# Patient Record
Sex: FEMALE | Race: WHITE | NOT HISPANIC OR LATINO | Employment: FULL TIME | URBAN - METROPOLITAN AREA
[De-identification: names, ages, dates, MRNs, and addresses within clinical notes are randomized per-mention and may not be internally consistent; named-entity substitution may affect disease eponyms.]

---

## 2020-07-30 ENCOUNTER — TELEPHONE (OUTPATIENT)
Dept: OBGYN CLINIC | Facility: CLINIC | Age: 30
End: 2020-07-30

## 2020-07-30 RX ORDER — LEVONORGESTREL AND ETHINYL ESTRADIOL 6-5-10
1 KIT ORAL DAILY
COMMUNITY
End: 2020-08-12 | Stop reason: SDUPTHER

## 2020-07-30 RX ORDER — LEVONORGESTREL AND ETHINYL ESTRADIOL 6-5-10
1 KIT ORAL DAILY
Qty: 28 TABLET | Refills: 1 | Status: CANCELLED | OUTPATIENT
Start: 2020-07-30

## 2020-08-12 DIAGNOSIS — Z30.41 ENCOUNTER FOR SURVEILLANCE OF CONTRACEPTIVE PILLS: Primary | ICD-10-CM

## 2020-08-12 RX ORDER — LEVONORGESTREL AND ETHINYL ESTRADIOL 6-5-10
1 KIT ORAL DAILY
Qty: 28 TABLET | Refills: 1 | Status: SHIPPED | OUTPATIENT
Start: 2020-08-12 | End: 2020-08-12 | Stop reason: SDUPTHER

## 2020-08-12 RX ORDER — LEVONORGESTREL AND ETHINYL ESTRADIOL 6-5-10
1 KIT ORAL DAILY
Qty: 28 TABLET | Refills: 1 | Status: SHIPPED | OUTPATIENT
Start: 2020-08-12 | End: 2020-08-17 | Stop reason: SDUPTHER

## 2020-08-14 ENCOUNTER — TELEPHONE (OUTPATIENT)
Dept: OBGYN CLINIC | Facility: CLINIC | Age: 30
End: 2020-08-14

## 2020-08-14 DIAGNOSIS — Z30.41 ENCOUNTER FOR SURVEILLANCE OF CONTRACEPTIVE PILLS: ICD-10-CM

## 2020-08-14 RX ORDER — LEVONORGESTREL AND ETHINYL ESTRADIOL 6-5-10
1 KIT ORAL DAILY
Qty: 28 TABLET | Refills: 1 | Status: CANCELLED | OUTPATIENT
Start: 2020-08-14

## 2020-08-17 DIAGNOSIS — Z30.41 ENCOUNTER FOR SURVEILLANCE OF CONTRACEPTIVE PILLS: ICD-10-CM

## 2020-08-17 RX ORDER — LEVONORGESTREL AND ETHINYL ESTRADIOL 6-5-10
1 KIT ORAL DAILY
Qty: 84 TABLET | Refills: 0 | Status: SHIPPED | OUTPATIENT
Start: 2020-08-17 | End: 2020-08-24 | Stop reason: SDUPTHER

## 2020-08-24 ENCOUNTER — ANNUAL EXAM (OUTPATIENT)
Dept: OBGYN CLINIC | Facility: CLINIC | Age: 30
End: 2020-08-24
Payer: COMMERCIAL

## 2020-08-24 VITALS — TEMPERATURE: 98.5 F | DIASTOLIC BLOOD PRESSURE: 78 MMHG | WEIGHT: 263 LBS | SYSTOLIC BLOOD PRESSURE: 120 MMHG

## 2020-08-24 DIAGNOSIS — Z01.419 ROUTINE GYNECOLOGICAL EXAMINATION: Primary | ICD-10-CM

## 2020-08-24 DIAGNOSIS — Z11.51 SCREENING FOR HUMAN PAPILLOMAVIRUS: ICD-10-CM

## 2020-08-24 DIAGNOSIS — Z12.4 SCREENING FOR MALIGNANT NEOPLASM OF THE CERVIX: ICD-10-CM

## 2020-08-24 DIAGNOSIS — Z30.41 ENCOUNTER FOR SURVEILLANCE OF CONTRACEPTIVE PILLS: ICD-10-CM

## 2020-08-24 PROCEDURE — 87624 HPV HI-RISK TYP POOLED RSLT: CPT | Performed by: OBSTETRICS & GYNECOLOGY

## 2020-08-24 PROCEDURE — G0145 SCR C/V CYTO,THINLAYER,RESCR: HCPCS | Performed by: OBSTETRICS & GYNECOLOGY

## 2020-08-24 PROCEDURE — 99385 PREV VISIT NEW AGE 18-39: CPT | Performed by: OBSTETRICS & GYNECOLOGY

## 2020-08-24 RX ORDER — LEVONORGESTREL AND ETHINYL ESTRADIOL 6-5-10
1 KIT ORAL DAILY
Qty: 84 TABLET | Refills: 3 | Status: SHIPPED | OUTPATIENT
Start: 2020-08-24 | End: 2020-09-14 | Stop reason: SDUPTHER

## 2020-08-24 NOTE — PROGRESS NOTES
Subjective      Dyan Lopez is a 27 y o  female who presents for annual well woman exam  Periods are regular every 28-30 days, lasting 4 days  No intermenstrual bleeding, spotting, or discharge  Current contraception: OCP (estrogen/progesterone)  History of abnormal Pap smear: no  Family history of uterine or ovarian cancer: no  Regular self breast exam: yes    Menstrual History:  OB History        1    Para   1    Term   1            AB        Living   1       SAB        TAB        Ectopic        Multiple        Live Births   1           Obstetric Comments   1 C/S - IOL for hypertension, failed induction x2          Patient's last menstrual period was 2020 (exact date)  Period Cycle (Days): 28  Period Duration (Days): 4-5 days  Period Pattern: Regular  Menstrual Flow: Light  Dysmenorrhea: None    The following portions of the patient's history were reviewed and updated as appropriate: allergies, current medications, past family history, past medical history, past social history, past surgical history and problem list     Review of Systems  Review of Systems   Constitutional: Negative for activity change, appetite change, chills, fatigue and fever  Respiratory: Negative for cough and shortness of breath  Cardiovascular: Negative for chest pain, palpitations and leg swelling  Gastrointestinal: Negative for abdominal pain, constipation, diarrhea, nausea and vomiting  Genitourinary: Negative for difficulty urinating, dysuria, flank pain, frequency, hematuria, urgency and vaginal discharge  Neurological: Negative for dizziness and headaches  Psychiatric/Behavioral: Negative for confusion            Objective      /78 (BP Location: Left arm, Patient Position: Sitting, Cuff Size: Extra-Large)   Temp 98 5 °F (36 9 °C) (Tympanic)   Wt 119 kg (263 lb)   LMP 2020 (Exact Date)     Physical Exam  OBGyn Exam     General:   alert and oriented, in no acute distress, alert, appears stated age and cooperative obese   Heart: regular rate and rhythm, S1, S2 normal, no murmur, click, rub or gallop   Lungs: clear to auscultation bilaterally   Abdomen: soft, non-tender, without masses or organomegaly   Vulva: normal   Vagina: normal mucosa, normal discharge   Cervix: no bleeding following Pap and no cervical motion tenderness   Uterus: normal size   Adnexa:  Breast Exam:  normal adnexa and no mass, fullness, tenderness  breasts appear normal, no suspicious masses, no skin or nipple changes or axillary nodes  Assessment      @well woman@   66-year-old female  Annual exam  Prior   Severe preeclampsia  OCP contraception desire to continue  Plan   Pap/HPV  Diet/exercise  Calcium/vitamin-D  Return to office for annual exam   All questions answered  There are no Patient Instructions on file for this visit

## 2020-08-27 LAB
HPV HR 12 DNA CVX QL NAA+PROBE: NEGATIVE
HPV16 DNA CVX QL NAA+PROBE: NEGATIVE
HPV18 DNA CVX QL NAA+PROBE: NEGATIVE

## 2020-08-28 LAB
LAB AP GYN PRIMARY INTERPRETATION: NORMAL
LAB AP LMP: NORMAL
Lab: NORMAL

## 2020-09-14 ENCOUNTER — TELEPHONE (OUTPATIENT)
Dept: OBGYN CLINIC | Facility: CLINIC | Age: 30
End: 2020-09-14

## 2020-09-14 DIAGNOSIS — Z30.41 ENCOUNTER FOR SURVEILLANCE OF CONTRACEPTIVE PILLS: ICD-10-CM

## 2020-09-14 RX ORDER — LEVONORGESTREL AND ETHINYL ESTRADIOL 6-5-10
1 KIT ORAL DAILY
Qty: 84 TABLET | Refills: 3 | Status: SHIPPED | OUTPATIENT
Start: 2020-09-14 | End: 2021-07-26 | Stop reason: SDUPTHER

## 2020-09-14 NOTE — TELEPHONE ENCOUNTER
Medication was not able to sent directly to the pharmacy please print medication infected to the pharmacy

## 2020-09-14 NOTE — TELEPHONE ENCOUNTER
She was here for  A yearly visit and script was sent to Pharmacy , patient states they said they never received the prescription, can you resend

## 2021-07-26 DIAGNOSIS — Z30.41 ENCOUNTER FOR SURVEILLANCE OF CONTRACEPTIVE PILLS: ICD-10-CM

## 2021-07-26 RX ORDER — LEVONORGESTREL AND ETHINYL ESTRADIOL 6-5-10
1 KIT ORAL DAILY
Qty: 84 TABLET | Refills: 0 | Status: SHIPPED | OUTPATIENT
Start: 2021-07-26 | End: 2021-08-02 | Stop reason: SDUPTHER

## 2021-07-26 NOTE — TELEPHONE ENCOUNTER
patient would like  Refills until her appointment in September  levonorgestrel-ethinyl estradiol (ENPRESSE) 50-30/75-40/ 125-30 MCG tablet (triphasic) [[id number]]  levonorgestrel-ethinyl estradiol (ENPRESSE) 50-30/75-40/ 125-30 MCG tablet (triphasic) [[id number]]     Order Details  Dose: 1 tablet Route: Oral Frequency: Daily   Dispense Quantity: 84 tablet Refills: 3          Sig: Take 1 tablet by mouth daily

## 2021-08-02 DIAGNOSIS — Z30.41 ENCOUNTER FOR SURVEILLANCE OF CONTRACEPTIVE PILLS: ICD-10-CM

## 2021-08-02 RX ORDER — LEVONORGESTREL AND ETHINYL ESTRADIOL 6-5-10
1 KIT ORAL DAILY
Qty: 84 TABLET | Refills: 0 | Status: SHIPPED | OUTPATIENT
Start: 2021-08-02 | End: 2021-09-10 | Stop reason: SDUPTHER

## 2021-08-02 RX ORDER — LEVONORGESTREL AND ETHINYL ESTRADIOL 6-5-10
1 KIT ORAL DAILY
Qty: 84 TABLET | Refills: 0 | Status: SHIPPED | OUTPATIENT
Start: 2021-08-02 | End: 2021-08-02 | Stop reason: SDUPTHER

## 2021-08-02 NOTE — TELEPHONE ENCOUNTER
Spoke with the patient and advised the patient to call the pharmacy, that medication was sent in for her

## 2021-09-10 ENCOUNTER — ANNUAL EXAM (OUTPATIENT)
Dept: OBGYN CLINIC | Facility: CLINIC | Age: 31
End: 2021-09-10
Payer: COMMERCIAL

## 2021-09-10 VITALS
DIASTOLIC BLOOD PRESSURE: 80 MMHG | HEIGHT: 61 IN | BODY MASS INDEX: 49.69 KG/M2 | SYSTOLIC BLOOD PRESSURE: 124 MMHG | WEIGHT: 263.2 LBS

## 2021-09-10 DIAGNOSIS — Z01.419 ENCOUNTER FOR GYNECOLOGICAL EXAMINATION WITHOUT ABNORMAL FINDING: Primary | ICD-10-CM

## 2021-09-10 DIAGNOSIS — Z30.41 ENCOUNTER FOR SURVEILLANCE OF CONTRACEPTIVE PILLS: ICD-10-CM

## 2021-09-10 PROCEDURE — 99395 PREV VISIT EST AGE 18-39: CPT | Performed by: PHYSICIAN ASSISTANT

## 2021-09-10 PROCEDURE — G0145 SCR C/V CYTO,THINLAYER,RESCR: HCPCS | Performed by: PHYSICIAN ASSISTANT

## 2021-09-10 PROCEDURE — G0476 HPV COMBO ASSAY CA SCREEN: HCPCS | Performed by: PHYSICIAN ASSISTANT

## 2021-09-10 RX ORDER — LEVONORGESTREL AND ETHINYL ESTRADIOL 6-5-10
1 KIT ORAL DAILY
Qty: 84 TABLET | Refills: 3 | Status: SHIPPED | OUTPATIENT
Start: 2021-09-10

## 2021-09-10 NOTE — PROGRESS NOTES
Assessment/Plan:    No problem-specific Assessment & Plan notes found for this encounter  Diagnoses and all orders for this visit:    Encounter for gynecological examination without abnormal finding  -     Liquid-based pap, screening    Encounter for surveillance of contraceptive pills  -     levonorgestrel-ethinyl estradiol (ENPRESSE) 50-30/75-40/ 125-30 MCG tablet (triphasic); Take 1 tablet by mouth daily        Pap done  Refills of OCP sent to pharmacy  Counseled patient she would most likely start aspirin once pregnancy is achieved  Start PNV 3 months prior to trying to conceive  If no problems, patient to return in 1 year for routine gyn care  Subjective:      Patient ID: Dejah Aparicio is a 32 y o  female  Patient is here for annual exam   States she is doing well  No complaints on her OCP  Periods are regular every 28 days, and bleeding lasts for 4 days  She denies any heavy bleeding, severe cramping, headache, and mood symptoms  Requests refills today  Patient also denies any change in bowel/bladder habits, pelvic pain, bloating, abdominal pain, n/v, change in appetite, and thyroid disease  She and her  would like to start trying to conceive in the spring/summer 2022  Has questions regarding starting aspirin therapy secondary to history of pre-eclampsia  Patient is performing self-breast exam   Denies new masses, skin changes, nipple discharge, and pain/tenderness  The following portions of the patient's history were reviewed and updated as appropriate: allergies, current medications, past family history, past medical history, past social history, past surgical history and problem list     Review of Systems   Constitutional: Negative for appetite change and unexpected weight change  Cardiovascular:        No masses, skin changes, nipple discharge, and pain/tenderness     Gastrointestinal: Negative for abdominal distention, abdominal pain, constipation, diarrhea, nausea and vomiting  Genitourinary: Negative for difficulty urinating, dysuria, frequency, genital sores, hematuria, menstrual problem, pelvic pain, urgency, vaginal bleeding, vaginal discharge and vaginal pain  Objective:      /80 (BP Location: Left arm, Patient Position: Sitting, Cuff Size: Standard)   Ht 5' 1" (1 549 m)   Wt 119 kg (263 lb 3 2 oz)   LMP 08/25/2021   BMI 49 73 kg/m²          Physical Exam  Vitals reviewed  Exam conducted with a chaperone present  Constitutional:       Appearance: Normal appearance  She is well-developed  She is obese  Neck:      Thyroid: No thyromegaly  Pulmonary:      Effort: Pulmonary effort is normal    Chest:      Breasts: Breasts are symmetrical          Right: Normal  No swelling, bleeding, inverted nipple, mass, nipple discharge, skin change or tenderness  Left: Normal  No swelling, bleeding, inverted nipple, mass, nipple discharge, skin change or tenderness  Abdominal:      General: Abdomen is flat  There is no distension  Palpations: Abdomen is soft  Tenderness: There is no abdominal tenderness  Genitourinary:     General: Normal vulva  Pubic Area: No rash  Labia:         Right: No rash, tenderness, lesion or injury  Left: No rash, tenderness, lesion or injury  Vagina: Normal  No vaginal discharge, erythema, tenderness or bleeding  Cervix: Normal       Uterus: Normal        Adnexa: Right adnexa normal and left adnexa normal         Right: No mass, tenderness or fullness  Left: No mass, tenderness or fullness  Musculoskeletal:      Cervical back: Neck supple  Lymphadenopathy:      Cervical: No cervical adenopathy  Upper Body:      Right upper body: No supraclavicular or axillary adenopathy  Left upper body: No supraclavicular or axillary adenopathy  Lower Body: No right inguinal adenopathy  No left inguinal adenopathy  Skin:     General: Skin is warm and dry  Neurological:      Mental Status: She is alert and oriented to person, place, and time  Psychiatric:         Mood and Affect: Mood normal          Behavior: Behavior normal  Behavior is cooperative  Thought Content:  Thought content normal          Judgment: Judgment normal

## 2021-09-15 LAB
LAB AP GYN PRIMARY INTERPRETATION: NORMAL
Lab: NORMAL

## 2022-09-26 ENCOUNTER — ANNUAL EXAM (OUTPATIENT)
Dept: OBGYN CLINIC | Facility: CLINIC | Age: 32
End: 2022-09-26
Payer: COMMERCIAL

## 2022-09-26 VITALS
HEIGHT: 61 IN | DIASTOLIC BLOOD PRESSURE: 94 MMHG | WEIGHT: 279 LBS | SYSTOLIC BLOOD PRESSURE: 138 MMHG | BODY MASS INDEX: 52.67 KG/M2

## 2022-09-26 DIAGNOSIS — Z01.419 ENCOUNTER FOR GYNECOLOGICAL EXAMINATION WITHOUT ABNORMAL FINDING: Primary | ICD-10-CM

## 2022-09-26 PROCEDURE — G0145 SCR C/V CYTO,THINLAYER,RESCR: HCPCS | Performed by: PHYSICIAN ASSISTANT

## 2022-09-26 PROCEDURE — G0476 HPV COMBO ASSAY CA SCREEN: HCPCS | Performed by: PHYSICIAN ASSISTANT

## 2022-09-26 PROCEDURE — 99395 PREV VISIT EST AGE 18-39: CPT | Performed by: PHYSICIAN ASSISTANT

## 2022-09-26 NOTE — PROGRESS NOTES
Assessment/Plan:    No problem-specific Assessment & Plan notes found for this encounter  Diagnoses and all orders for this visit:    Encounter for gynecological examination without abnormal finding  -     Liquid-based pap, screening        Pap done  Continue PNV  Call if periods worsen or change  If no problems, patient to return in 1 year or with positive pregnancy test, whichever comes first     Subjective:      Patient ID: Gerard Kessler is a 28 y o  female  Patient is here for yearly gyn exam   States she is doing well overall  Stopped OCP as she and her  are trying to conceive  Periods are regular every 28 days, and bleeding lasts for 3-5 days  She denies heavy bleeding, severe cramping, HA, and mood symptoms  Patient also denies bowel/bladder changes, pelvic pain, bloating, abdominal pain, n/v, change in appetite, and thyroid disease  Taking PNV  Patient is performing self-breast exam   She denies new masses, skin changes, nipple discharge, and pain/tenderness  The following portions of the patient's history were reviewed and updated as appropriate: allergies, current medications, past family history, past medical history, past social history, past surgical history and problem list     Review of Systems   Constitutional: Negative for appetite change and unexpected weight change  Cardiovascular:        No masses, skin changes, nipple discharge, and pain/tenderness  Gastrointestinal: Negative for abdominal distention, abdominal pain, constipation, diarrhea, nausea and vomiting  Genitourinary: Negative for difficulty urinating, dysuria, frequency, genital sores, hematuria, menstrual problem, pelvic pain, urgency, vaginal bleeding, vaginal discharge and vaginal pain           Objective:      /94 (BP Location: Left arm, Patient Position: Sitting, Cuff Size: Adult)   Ht 5' 1" (1 549 m)   Wt 127 kg (279 lb)   LMP 09/08/2022 (Exact Date)   BMI 52 72 kg/m² Physical Exam  Vitals reviewed  Exam conducted with a chaperone present  Constitutional:       Appearance: Normal appearance  She is well-developed  She is obese  Neck:      Thyroid: No thyromegaly  Pulmonary:      Effort: Pulmonary effort is normal    Chest:   Breasts: Breasts are symmetrical       Right: Normal  No swelling, bleeding, inverted nipple, mass, nipple discharge, skin change, tenderness, axillary adenopathy or supraclavicular adenopathy  Left: Normal  No swelling, bleeding, inverted nipple, mass, nipple discharge, skin change, tenderness, axillary adenopathy or supraclavicular adenopathy  Abdominal:      General: Abdomen is flat  There is no distension  Palpations: Abdomen is soft  Tenderness: There is no abdominal tenderness  Genitourinary:     General: Normal vulva  Pubic Area: No rash  Labia:         Right: No rash, tenderness, lesion or injury  Left: No rash, tenderness, lesion or injury  Vagina: Normal  No vaginal discharge, erythema, tenderness or bleeding  Cervix: Normal       Uterus: Normal        Adnexa: Right adnexa normal and left adnexa normal         Right: No mass, tenderness or fullness  Left: No mass, tenderness or fullness  Musculoskeletal:      Cervical back: Neck supple  Lymphadenopathy:      Cervical: No cervical adenopathy  Upper Body:      Right upper body: No supraclavicular or axillary adenopathy  Left upper body: No supraclavicular or axillary adenopathy  Lower Body: No right inguinal adenopathy  No left inguinal adenopathy  Skin:     General: Skin is warm and dry  Neurological:      Mental Status: She is alert and oriented to person, place, and time  Psychiatric:         Mood and Affect: Mood normal          Behavior: Behavior normal  Behavior is cooperative  Thought Content:  Thought content normal          Judgment: Judgment normal

## 2022-10-06 LAB
LAB AP GYN PRIMARY INTERPRETATION: NORMAL
Lab: NORMAL

## 2023-01-25 ENCOUNTER — OFFICE VISIT (OUTPATIENT)
Dept: OBGYN CLINIC | Facility: CLINIC | Age: 33
End: 2023-01-25

## 2023-01-25 VITALS
SYSTOLIC BLOOD PRESSURE: 138 MMHG | DIASTOLIC BLOOD PRESSURE: 82 MMHG | WEIGHT: 289 LBS | BODY MASS INDEX: 54.56 KG/M2 | HEIGHT: 61 IN

## 2023-01-25 DIAGNOSIS — Z11.3 SCREENING EXAMINATION FOR STD (SEXUALLY TRANSMITTED DISEASE): Primary | ICD-10-CM

## 2023-01-25 DIAGNOSIS — N91.2 AMENORRHEA: Primary | ICD-10-CM

## 2023-01-25 NOTE — PROGRESS NOTES
Assessment/Plan:     There are no diagnoses linked to this encounter  70-year-old female  Pregnant IUP 8 weeks and 3 days [facility] 2023  Prior  secondary to failure of induction  History of preeclampsia  Plan   Prenatal vitamin daily  Baby aspirin at bedtime till 36 weeks  Return to office for MICKY  Subjective:      Patient ID: Kalpana Cifuentes is a 28 y o  female  HPI  27yO FEMALE   Here today for pregnancy confirmation   Failure of Induction and had preeclampsia      lmp 2022  This is planned pregnancy partner was present at the visit as well as her daughter Iva Gallo 1years old      The following portions of the patient's history were reviewed and updated as appropriate: allergies, current medications, past family history, past medical history, past social history, past surgical history and problem list     Review of Systems   Constitutional: Negative for activity change, appetite change, chills, fatigue and fever  Respiratory: Negative for apnea, cough, chest tightness and shortness of breath  Cardiovascular: Negative for chest pain, palpitations and leg swelling  Gastrointestinal: Negative for abdominal pain, constipation, diarrhea, nausea and vomiting  Genitourinary: Negative for difficulty urinating, dysuria, flank pain, frequency, hematuria and urgency  Neurological: Negative for dizziness, seizures, syncope, light-headedness, numbness and headaches  Psychiatric/Behavioral: Negative for agitation and confusion  Objective:      /82 (BP Location: Left arm, Patient Position: Sitting, Cuff Size: Adult)   Ht 5' 1" (1 549 m)   Wt 131 kg (289 lb)   LMP 2022 (Exact Date)   BMI 54 61 kg/m²          Physical Exam  Constitutional:       Appearance: She is well-developed  Abdominal:      General: There is no distension  Palpations: Abdomen is soft  Tenderness: There is no abdominal tenderness     Genitourinary:     Labia:         Right: No rash, tenderness or lesion  Left: No rash, tenderness or lesion  Vagina: No signs of injury  No vaginal discharge, erythema or tenderness  Cervix: No cervical motion tenderness, discharge or friability  Adnexa:         Right: No mass, tenderness or fullness  Left: No mass, tenderness or fullness  Comments: Ultrasound performed intrauterine pregnancy 8 weeks and 3 days by CRL fetal heart rate 155 bpm  Neurological:      Mental Status: She is alert and oriented to person, place, and time     Psychiatric:         Behavior: Behavior normal

## 2023-01-26 LAB
C TRACH DNA SPEC QL NAA+PROBE: NEGATIVE
N GONORRHOEA DNA SPEC QL NAA+PROBE: NEGATIVE

## 2023-02-03 ENCOUNTER — INITIAL PRENATAL (OUTPATIENT)
Dept: OBGYN CLINIC | Facility: CLINIC | Age: 33
End: 2023-02-03

## 2023-02-03 VITALS
SYSTOLIC BLOOD PRESSURE: 138 MMHG | BODY MASS INDEX: 54.56 KG/M2 | DIASTOLIC BLOOD PRESSURE: 82 MMHG | HEIGHT: 61 IN | WEIGHT: 289 LBS

## 2023-02-03 DIAGNOSIS — Z34.91 PRENATAL CARE IN FIRST TRIMESTER: Primary | ICD-10-CM

## 2023-02-03 RX ORDER — ASPIRIN 81 MG/1
81 TABLET, CHEWABLE ORAL DAILY
COMMUNITY

## 2023-02-03 NOTE — LETTER
Callie Bauman 02/03/23    Gracie Giang  1990      To Whom It May Concern:    Gracie iGang  is currently under obstetrical care with an Estimated Date of Delivery: 9/4/23  We encourage good oral hygiene during pregnancy, including regular dental cleanings  If it becomes necessary for additional dental work to be done such as fillings, root canal, etc , it is best to have it done after the first trimester  However, emergency care can be given throughout pregnancy as needed  The following are recommendations for the obstetrical patients:    1  Double shielded x-rays  2  Local anesthetic without epinephrine  3  Tylenol with codeine for pain  4  Amoxicillin, Penicillin, Erythromycin, Keflex or other Cephalosporins for antibiotics  5  Inhalation therapy and other antibiotics are not recommended  If you have any other questions regarding this patient, please do not hesitate to call us      Thank you,         ROLANDO Cota MD

## 2023-02-03 NOTE — PROGRESS NOTES
OB Intake    Patient presents for OB intake interview  Accompanied by: Phone intake    Planned  pregnancy, FOB involved and supportive      Hx of  delivery prior to 36 weeks 6 days: No  Hx of hypertension:  Pre eclampsia  Patient's last menstrual period was 2022 (exact date)  Estimated Date of Delivery: 23  Signs and Symptoms of pregnancy:  - No swelling or dizziness, no visual problems, no nausea or vomiting   - Constipation: No  - Headaches: No  - Cramping/spotting: No  - PICA cravings: Mo  - Cats:No  - Diabetes:   • History of gestational diabetes : No  • BMI >35  : yes  Weight 289 lb, pre pregnancy weight 270 lb   • Advance maternal age >35 : No  • First degree relative with type 2 diabetes : yes  • History of PCOS : No  • Current metformin use : No  • Prior history of macrosomia or LGA :NO    Prenatal labs including: CBC,ABO, Rubella, Hepatitis, RPR,HIV, Cystic fibrosis and spinal muscular atrophy carrier screen, varicella,  1 hour GTT, PIH Labs   Last Pap:  2022  Tdap - counseled to be given after 27 weeks  Influenza vaccine discussed and information sheet given  Vaccinated: No  COVId Vaccine :NO  Hx of MRSA: No  Dental visit with last 6 months -  yes, recommendations discussed  Interview education:  [facility] Pregnancy Essentials booklet reviewed and explained  Handouts given at today's visit     [facility] Baby & me phone application guide   [facility] Baby & Me support center   [facility] Maternal Fetal Medicine        Sequential screening pamphlet                   Cystic fibrosis information    [facility]: [facility]a Lunsford form submitted: No    Mychart activated:YES     Interview done by : Margaux Chisholm LPN

## 2023-02-09 ENCOUNTER — OFFICE VISIT (OUTPATIENT)
Dept: URGENT CARE | Facility: CLINIC | Age: 33
End: 2023-02-09

## 2023-02-09 VITALS
TEMPERATURE: 97.9 F | HEART RATE: 82 BPM | RESPIRATION RATE: 15 BRPM | HEIGHT: 61 IN | WEIGHT: 280 LBS | DIASTOLIC BLOOD PRESSURE: 73 MMHG | SYSTOLIC BLOOD PRESSURE: 152 MMHG | BODY MASS INDEX: 52.87 KG/M2 | OXYGEN SATURATION: 98 %

## 2023-02-09 DIAGNOSIS — J02.0 STREP PHARYNGITIS: Primary | ICD-10-CM

## 2023-02-09 RX ORDER — AMOXICILLIN 500 MG/1
500 CAPSULE ORAL EVERY 12 HOURS SCHEDULED
Qty: 20 CAPSULE | Refills: 0 | Status: SHIPPED | OUTPATIENT
Start: 2023-02-09 | End: 2023-02-19

## 2023-02-09 NOTE — PROGRESS NOTES
[facility]        NAME: Doreen Torres is a 28 y o  female  : 1990    MRN: [id number]  DATE: 2023  TIME: 8:40 AM    Assessment and Plan   Strep pharyngitis [J02 0]  1  Strep pharyngitis  amoxicillin (AMOXIL) 500 mg capsule            Patient Instructions       Continue to monitor symptoms  If new or worsening symptoms develop, go immediately to Er  Drink plenty of fluids  Follow up with Family Doctor this week  Chief Complaint     Chief Complaint   Patient presents with   • Sore Throat     Pt reports she has had a sore throat since Monday night  History of Present Illness       Sore Throat   This is a new problem  Episode onset: 2 days ago  The problem has been gradually worsening  Neither side of throat is experiencing more pain than the other  There has been no fever  The pain is moderate  Associated symptoms include a hoarse voice and swollen glands  Pertinent negatives include no abdominal pain, congestion, coughing, diarrhea, ear pain, headaches, neck pain, shortness of breath or vomiting  She has had no exposure to strep or mono  She has tried nothing for the symptoms  The treatment provided no relief  Review of Systems   Review of Systems   Constitutional: Negative for chills, diaphoresis, fatigue and fever  HENT: Positive for hoarse voice and sore throat  Negative for congestion, ear pain, postnasal drip, rhinorrhea, sinus pressure, sinus pain, sneezing and voice change  Eyes: Negative  Respiratory: Negative for cough, chest tightness, shortness of breath and wheezing  Cardiovascular: Negative for chest pain and palpitations  Gastrointestinal: Negative for abdominal pain, constipation, diarrhea, nausea and vomiting  Endocrine: Negative  Genitourinary: Negative for dysuria  Musculoskeletal: Negative for back pain, myalgias and neck pain  Skin: Negative for pallor and rash  Allergic/Immunologic: Negative      Neurological: Negative for dizziness, syncope and headaches  Hematological: Negative  Psychiatric/Behavioral: Negative  Current Medications       Current Outpatient Medications:   •  amoxicillin (AMOXIL) 500 mg capsule, Take 1 capsule (500 mg total) by mouth every 12 (twelve) hours for 10 days, Disp: 20 capsule, Rfl: 0  •  aspirin 81 mg chewable tablet, Chew 81 mg daily, Disp: , Rfl:   •  Prenatal Vit-Fe Fumarate-FA (PRENATAL VITAMIN PO), Take by mouth, Disp: , Rfl:     Current Allergies     Allergies as of 2023 - Reviewed 2023   Allergen Reaction Noted   • Shellfish-derived products - food allergy Anaphylaxis 2018   • Other Eye Swelling 2018            The following portions of the patient's history were reviewed and updated as appropriate: allergies, current medications, past family history, past medical history, past social history, past surgical history and problem list      Past Medical History:   Diagnosis Date   • Hypertension     pre eclampsia   • Seasonal allergies    • Varicella     as a child       Past Surgical History:   Procedure Laterality Date   •  SECTION  2019   • WISDOM TOOTH EXTRACTION         Family History   Problem Relation Age of Onset   • Diabetes Mother    • Hypertension Mother    • COPD Mother    • Hyperlipidemia Father    • No Known Problems Sister    • No Known Problems Daughter    • Heart attack Maternal Grandmother    • Heart attack Maternal Grandfather    • Dementia Paternal Grandmother    • Dementia Paternal Grandfather          Medications have been verified  Objective   /73   Pulse 82   Temp 97 9 °F (36 6 °C)   Resp 15   Ht 5' 1" (1 549 m)   Wt 127 kg (280 lb)   LMP 2022 (Exact Date)   SpO2 98%   BMI 52 91 kg/m²        Physical Exam     Physical Exam  Vitals and nursing note reviewed  Constitutional:       General: She is not in acute distress  Appearance: Normal appearance  She is well-developed   She is not ill-appearing or diaphoretic  HENT:      Head: Normocephalic and atraumatic  Right Ear: Tympanic membrane, ear canal and external ear normal       Left Ear: Tympanic membrane, ear canal and external ear normal       Nose: Nose normal  No congestion or rhinorrhea  Mouth/Throat:      Pharynx: Oropharyngeal exudate (b/l) and posterior oropharyngeal erythema present  Comments: B/l swelling but airway patent and handling own secretions  Eyes:      General:         Right eye: No discharge  Left eye: No discharge  Conjunctiva/sclera: Conjunctivae normal    Cardiovascular:      Rate and Rhythm: Normal rate and regular rhythm  Heart sounds: Normal heart sounds  Pulmonary:      Effort: Pulmonary effort is normal  No respiratory distress  Breath sounds: Normal breath sounds  No wheezing, rhonchi or rales  Musculoskeletal:      Cervical back: Normal range of motion and neck supple  Lymphadenopathy:      Cervical: No cervical adenopathy  Skin:     General: Skin is warm  Capillary Refill: Capillary refill takes less than 2 seconds  Findings: No rash  Neurological:      Mental Status: She is alert

## 2023-02-09 NOTE — LETTER
February 9, 2023     Patient: Marlen Henry   YOB: 1990   Date of Visit: 2/9/2023       To Whom It May Concern: It is my medical opinion that Marlen Henry may return to work on 2/10/2023  If you have any questions or concerns, please don't hesitate to call           Sincerely,        Karin Barcenas PA-C    CC: No Recipients

## 2023-02-09 NOTE — PATIENT INSTRUCTIONS
Continue to monitor symptoms  If new or worsening symptoms develop, go immediately to Er  Drink plenty of fluids  Follow up with Family Doctor this week  Strep Throat   WHAT YOU NEED TO KNOW:   Strep throat is a throat infection caused by bacteria  It is easily spread from person to person  DISCHARGE INSTRUCTIONS:   Call 911 for any of the following: You have trouble breathing  Return to the emergency department if:   You have new symptoms like a bad headache, stiff neck, chest pain, or vomiting  You are drooling because you cannot swallow your spit  Contact your healthcare provider if:   You have a fever  You have a rash or ear pain  You have green, yellow-brown, or bloody mucus when you cough or blow your nose  You are unable to drink anything  You have questions or concerns about your condition or care  Medicines:   Antibiotics  help treat your strep throat  You should feel better within 2 to 3 days after you start antibiotics  Take your medicine as directed  Contact your healthcare provider if you think your medicine is not helping or if you have side effects  Tell him or her if you are allergic to any medicine  Keep a list of the medicines, vitamins, and herbs you take  Include the amounts, and when and why you take them  Bring the list or the pill bottles to follow-up visits  Carry your medicine list with you in case of an emergency  Manage your symptoms:   Use lozenges, ice, soft foods, or popsicles  to soothe your throat  Drink juice, milk shakes, or soup  if your throat is too sore to eat solid food  Drinking liquids can also help prevent dehydration  Gargle with salt water  Mix ¼ teaspoon salt in a glass of warm water and gargle  This may help reduce swelling in your throat  Do not smoke  Nicotine and other chemicals in cigarettes and cigars can cause lung damage and make your symptoms worse   Ask your healthcare provider for information if you currently smoke and need help to quit  E-cigarettes or smokeless tobacco still contain nicotine  Talk to your healthcare provider before you use these products  Return to work or school  24 hours after you start antibiotic medicine  Prevent the spread of strep throat:   Wash your hands often  Use soap and water  Wash your hands after you use the bathroom, change a child's diapers, or sneeze  Wash your hands before you prepare or eat food  Do not share food or drinks  Replace your toothbrush after you have taken antibiotics for 24 hours  Follow up with your doctor as directed:  Write down your questions so you remember to ask them during your visits  © Copyright [employer] 2022 Information is for End User's use only and may not be sold, redistributed or otherwise used for commercial purposes  All illustrations and images included in CareNotes® are the copyrighted property of [employer]  or Jocelyn Greer  The above information is an  only  It is not intended as medical advice for individual conditions or treatments  Talk to your doctor, nurse or pharmacist before following any medical regimen to see if it is safe and effective for you

## 2023-02-13 ENCOUNTER — APPOINTMENT (OUTPATIENT)
Dept: LAB | Facility: MEDICAL CENTER | Age: 33
End: 2023-02-13

## 2023-02-13 DIAGNOSIS — Z34.91 PRENATAL CARE IN FIRST TRIMESTER: ICD-10-CM

## 2023-02-13 LAB
ABO GROUP BLD: NORMAL
BACTERIA UR QL AUTO: ABNORMAL /HPF
BASOPHILS # BLD AUTO: 0.04 THOUSANDS/ÂΜL (ref 0–0.1)
BASOPHILS NFR BLD AUTO: 0 % (ref 0–1)
BILIRUB UR QL STRIP: NEGATIVE
BLD GP AB SCN SERPL QL: NEGATIVE
CLARITY UR: CLEAR
COLOR UR: YELLOW
CREAT UR-MCNC: 242 MG/DL
EOSINOPHIL # BLD AUTO: 0.11 THOUSAND/ÂΜL (ref 0–0.61)
EOSINOPHIL NFR BLD AUTO: 1 % (ref 0–6)
ERYTHROCYTE [DISTWIDTH] IN BLOOD BY AUTOMATED COUNT: 12.1 % (ref 11.6–15.1)
GLUCOSE 1H P 50 G GLC PO SERPL-MCNC: 130 MG/DL (ref 40–134)
GLUCOSE UR STRIP-MCNC: NEGATIVE MG/DL
HBV SURFACE AG SER QL: NORMAL
HCT VFR BLD AUTO: 39.7 % (ref 34.8–46.1)
HGB BLD-MCNC: 13 G/DL (ref 11.5–15.4)
HGB UR QL STRIP.AUTO: NEGATIVE
HIV 1+2 AB+HIV1 P24 AG SERPL QL IA: NORMAL
HIV 2 AB SERPL QL IA: NORMAL
HIV1 AB SERPL QL IA: NORMAL
HIV1 P24 AG SERPL QL IA: NORMAL
HYALINE CASTS #/AREA URNS LPF: ABNORMAL /LPF
IMM GRANULOCYTES # BLD AUTO: 0.04 THOUSAND/UL (ref 0–0.2)
IMM GRANULOCYTES NFR BLD AUTO: 0 % (ref 0–2)
KETONES UR STRIP-MCNC: NEGATIVE MG/DL
LEUKOCYTE ESTERASE UR QL STRIP: ABNORMAL
LYMPHOCYTES # BLD AUTO: 2.54 THOUSANDS/ÂΜL (ref 0.6–4.47)
LYMPHOCYTES NFR BLD AUTO: 28 % (ref 14–44)
MCH RBC QN AUTO: 29 PG (ref 26.8–34.3)
MCHC RBC AUTO-ENTMCNC: 32.7 G/DL (ref 31.4–37.4)
MCV RBC AUTO: 89 FL (ref 82–98)
MONOCYTES # BLD AUTO: 0.46 THOUSAND/ÂΜL (ref 0.17–1.22)
MONOCYTES NFR BLD AUTO: 5 % (ref 4–12)
MUCOUS THREADS UR QL AUTO: ABNORMAL
NEUTROPHILS # BLD AUTO: 6.06 THOUSANDS/ÂΜL (ref 1.85–7.62)
NEUTS SEG NFR BLD AUTO: 66 % (ref 43–75)
NITRITE UR QL STRIP: NEGATIVE
NON-SQ EPI CELLS URNS QL MICRO: ABNORMAL /HPF
NRBC BLD AUTO-RTO: 0 /100 WBCS
PH UR STRIP.AUTO: 6 [PH]
PLATELET # BLD AUTO: 241 THOUSANDS/UL (ref 149–390)
PMV BLD AUTO: 11.9 FL (ref 8.9–12.7)
PROT UR STRIP-MCNC: ABNORMAL MG/DL
PROT UR-MCNC: 12 MG/DL
PROT/CREAT UR: 0.05 MG/G{CREAT} (ref 0–0.1)
RBC # BLD AUTO: 4.48 MILLION/UL (ref 3.81–5.12)
RBC #/AREA URNS AUTO: ABNORMAL /HPF
RH BLD: POSITIVE
RUBV IGG SERPL IA-ACNC: 41.9 IU/ML
SP GR UR STRIP.AUTO: 1.02 (ref 1–1.03)
SPECIMEN EXPIRATION DATE: NORMAL
UROBILINOGEN UR STRIP-ACNC: <2 MG/DL
VZV IGG SER QL IA: NORMAL
WBC # BLD AUTO: 9.25 THOUSAND/UL (ref 4.31–10.16)
WBC #/AREA URNS AUTO: ABNORMAL /HPF

## 2023-02-14 LAB
BACTERIA UR CULT: NORMAL
RPR SER QL: NORMAL

## 2023-02-17 ENCOUNTER — TELEPHONE (OUTPATIENT)
Dept: OBGYN CLINIC | Facility: CLINIC | Age: 33
End: 2023-02-17

## 2023-02-17 NOTE — TELEPHONE ENCOUNTER
Patient called back regarding her last visit and with note in the chart,  She had elevated Blood pressure, and is taking her blood pressures at home and keeping a log of these, and will bring along at next visit,  Recommendations  Were blood sugar log, advise patient , it should of said blood pressure log this was a typical graphic error

## 2023-02-20 LAB
CF COMMENT: NORMAL
CFTR MUT ANL BLD/T: NORMAL
CLINICAL INFO: NORMAL
ETHNIC BACKGROUND STATED: NORMAL
GENE MUT TESTED BLD/T: NORMAL
GENERAL COMMENTS:: NORMAL
LAB DIRECTOR NAME PROVIDER: NORMAL
REASON FOR REFERRAL (NARRATIVE): NORMAL
REF LAB TEST METHOD: NORMAL
SL AMB DISCLAIMER: NORMAL
SL AMB GENETIC COUNSELOR: NORMAL
SMN1 GENE MUT ANL BLD/T: NORMAL
SPECIMEN SOURCE: NORMAL

## 2023-02-22 ENCOUNTER — ROUTINE PRENATAL (OUTPATIENT)
Dept: OBGYN CLINIC | Facility: CLINIC | Age: 33
End: 2023-02-22

## 2023-02-22 DIAGNOSIS — Z3A.12 12 WEEKS GESTATION OF PREGNANCY: Primary | ICD-10-CM

## 2023-02-22 LAB
SL AMB  POCT GLUCOSE, UA: NORMAL
SL AMB LEUKOCYTE ESTERASE,UA: NORMAL
SL AMB POCT NITRITE,UA: NORMAL
SL AMB POCT URINE PROTEIN: NORMAL

## 2023-02-23 VITALS
DIASTOLIC BLOOD PRESSURE: 84 MMHG | WEIGHT: 285 LBS | HEIGHT: 61 IN | BODY MASS INDEX: 53.81 KG/M2 | SYSTOLIC BLOOD PRESSURE: 132 MMHG

## 2023-02-23 NOTE — PROGRESS NOTES
Patient seen and evaluated denies any vaginal bleeding or pelvic pain  Patient is taking prenatal vitamin daily  Patient is measuring her blood pressure at home blood pressure today 132/84  Secondary to the history of preeclampsia and borderline blood pressure recommend to continue aspirin until 36 weeks as well as recommend to monitor blood pressure daily different times per day and to notify us if she has any high reading more than 140/90 goal of blood pressure in pregnancy reviewed and discussed with patient  Patient has appointment scheduled with maternal-fetal medicine for first trimester screen to keep that appointment as scheduled  Lab results reviewed and discussed with patient  To continue current management log blood pressure for the next 2 weeks and return to office in 2 weeks for blood pressure check and OB visit

## 2023-02-23 NOTE — PROGRESS NOTES
Please refer to the [facility] ultrasound report in Ob Procedures for additional information regarding today's visit

## 2023-02-24 ENCOUNTER — ROUTINE PRENATAL (OUTPATIENT)
Dept: PERINATAL CARE | Facility: OTHER | Age: 33
End: 2023-02-24

## 2023-02-24 VITALS
HEART RATE: 97 BPM | SYSTOLIC BLOOD PRESSURE: 135 MMHG | BODY MASS INDEX: 54.19 KG/M2 | WEIGHT: 287 LBS | DIASTOLIC BLOOD PRESSURE: 80 MMHG | HEIGHT: 61 IN

## 2023-02-24 DIAGNOSIS — O09.291 HX OF PREECLAMPSIA, PRIOR PREGNANCY, CURRENTLY PREGNANT, FIRST TRIMESTER: ICD-10-CM

## 2023-02-24 DIAGNOSIS — Z36.82 ENCOUNTER FOR ANTENATAL SCREENING FOR NUCHAL TRANSLUCENCY: ICD-10-CM

## 2023-02-24 DIAGNOSIS — Z3A.12 12 WEEKS GESTATION OF PREGNANCY: ICD-10-CM

## 2023-02-24 DIAGNOSIS — Z98.891 HISTORY OF CESAREAN SECTION: ICD-10-CM

## 2023-02-24 DIAGNOSIS — O99.211 MATERNAL MORBID OBESITY IN FIRST TRIMESTER, ANTEPARTUM (HCC): Primary | ICD-10-CM

## 2023-02-24 DIAGNOSIS — E66.01 MATERNAL MORBID OBESITY IN FIRST TRIMESTER, ANTEPARTUM (HCC): Primary | ICD-10-CM

## 2023-02-24 NOTE — PROGRESS NOTES
Patient chose to have [facility] NIPT Screening  Genetic counselor discussed with pt:  Patient made aware she will need to respond to text message or e-mail from [employer] within 2 business days or testing will be run through insurance  Patient informed text message will come from area code  "415"  Provided [email or web address] Client Services # [phone] and web site : [email or web address]     2 vial of bloods drawn from left arm, patient tolerated blood draw without difficulty  Specimen labeled with patient identifiers (name, date of birth, specimen collection date), order and specimen was verified with patient, packed and sent via [employer]  Copy of lab order scanned to Epic media by genetic counselor  Maternal Fetal Medicine will have results in 14+ business days and will call patient or notify via [address]  Patient verbalized understanding of all instructions and no questions at this time

## 2023-02-24 NOTE — LETTER
February 24, 2023     Roman Rosario, [address] [place]    Patient: Kerri Mackenzie   YOB: 1990   Date of Visit: 2/24/2023       Dear Dr Olvin Kirk:    Thank you for referring Kerri Mackenzie to me for evaluation  Below are my notes for this consultation  If you have questions, please do not hesitate to call me  I look forward to following your patient along with you           Sincerely,        Shane Valente MD        CC: No Recipients  Shane Valente MD  2/23/2023  5:23 PM  Sign when Signing Visit  Please refer to the [facility] ultrasound report in Ob Procedures for additional information regarding today's visit

## 2023-03-03 ENCOUNTER — ROUTINE PRENATAL (OUTPATIENT)
Dept: OBGYN CLINIC | Facility: CLINIC | Age: 33
End: 2023-03-03

## 2023-03-03 DIAGNOSIS — Z3A.13 13 WEEKS GESTATION OF PREGNANCY: Primary | ICD-10-CM

## 2023-03-03 DIAGNOSIS — Z36.9 ANTENATAL SCREENING ENCOUNTER: ICD-10-CM

## 2023-03-03 LAB
SL AMB  POCT GLUCOSE, UA: ABNORMAL
SL AMB LEUKOCYTE ESTERASE,UA: ABNORMAL
SL AMB POCT NITRITE,UA: ABNORMAL
SL AMB POCT URINE PROTEIN: ABNORMAL

## 2023-03-03 NOTE — PROGRESS NOTES
Assessment     Pregnancy 13 and 4/7 weeks     Plan    Routine OB visit, doing well overall  Hx pre-eclampsia w/ prior pregnancy  No complaints or concerns today  BP recheck 134/86, BP's noted by pt to be < 140/90 consistently  Trace protein on urine dip, asymptomatic  preeclamptic labs completed about 3 wks ago, urine pro/Cr ratio 0 05  Pt encouraged to continue monitoring Bp's at home 2-3 cx a day and bring log to visits  Should call/follow up sooner if BP's > 140/90      Prenatal labs WNL, varicella/rubella immune, B positive blood type, CF/SMA negative, passed 1hr gtt  NIPT screen negative  AFP ordered today, to be completed btwn 15-20wks  Level II MFM scheduled   Per Level I MFM note, advise interval growth US in 3rd trimester, as well as weekly NST beginning 34 wks, repeat 1hr gtt 28wks  secondary to elevated BMI  Stressed well balanced/healthy diet, plenty of water for hydration, exercise as tolerated  Continue PNV, ASA 162mg daily  Follow up in 3 weeks  CC: routine OB 13 wks    Luis Gallardo is a 28 y o  female being seen today for her obstetrical visit  She is at 13w4d gestation  Patient reports no contractions, no cramping, no leaking and Denies HA's blurred vision, dizziness, abd pain, swelling, N/V  Normal urination and BM  Karen Connors Fetal movement: too early to appreciate       Noting some slight brown discharge faint when she wipes, in Am x 3 days, then resolves as day goes on  She denies pelvic pain or LOF  She did not having sex 1-2 days prior  No urinary sxs    PNV and ASA taking daily       She is checking BP's at home intermittently:  BP's 125/83, 130/80, 127/75, 132/78, 120/74, 134/73    Menstrual History:  OB History        2    Para   1    Term   1       0    AB   0    Living   1       SAB   0    IAB   0    Ectopic   0    Multiple   0    Live Births   1           Obstetric Comments   1 C/S - IOL for hypertension, failed induction x2 Menarche age: N/A  Patient's last menstrual period was 11/28/2022 (exact date)  The following portions of the patient's history were reviewed and updated as appropriate: allergies, current medications, past medical history, past social history and problem list     Review of Systems  Pertinent items are noted in HPI       Objective     /90 (BP Location: Left arm, Patient Position: Sitting, Cuff Size: Large)   Ht 5' 1" (1 549 m)   Wt 130 kg (286 lb)   LMP 11/28/2022 (Exact Date)   BMI 54 04 kg/m²   FHT:  150 BPM

## 2023-03-06 VITALS
HEIGHT: 61 IN | WEIGHT: 286 LBS | DIASTOLIC BLOOD PRESSURE: 86 MMHG | BODY MASS INDEX: 54 KG/M2 | SYSTOLIC BLOOD PRESSURE: 134 MMHG

## 2023-03-24 ENCOUNTER — ROUTINE PRENATAL (OUTPATIENT)
Dept: OBGYN CLINIC | Facility: CLINIC | Age: 33
End: 2023-03-24

## 2023-03-24 VITALS
HEIGHT: 61 IN | DIASTOLIC BLOOD PRESSURE: 90 MMHG | SYSTOLIC BLOOD PRESSURE: 130 MMHG | BODY MASS INDEX: 54.19 KG/M2 | WEIGHT: 287 LBS

## 2023-03-24 DIAGNOSIS — Z34.82 ENCOUNTER FOR SUPERVISION OF NORMAL PREGNANCY IN MULTIGRAVIDA IN SECOND TRIMESTER: Primary | ICD-10-CM

## 2023-03-24 DIAGNOSIS — Z36.9 ENCOUNTER FOR ANTENATAL SCREENING: ICD-10-CM

## 2023-03-24 NOTE — PATIENT INSTRUCTIONS
F/u with MFM as planned  Continue PNV and aspirin  Continue to take BP readings at home; call if consistently elevated 140/90  Stay well hydrated  Go for blood work

## 2023-03-24 NOTE — PROGRESS NOTES
Assessment      Pregnancy 16 and 4/7 weeks     Plan     Problem list reviewed and updated  Labs reviewed  Genetic screening tests discussed: results reviewed  Role of ultrasound in pregnancy discussed; fetal survey: results reviewed  Amniocentesis discussed: not indicated  Follow up in 2 weeks  50% of 15 minute visit spent on counseling and coordination of care   on bedside U/S; good fetal movement seen  F/u with MFM as planned  Continue PNV and aspirin  Continue to take BP readings at home; call if consistently elevated 140/90  Stay well hydrated  Go for blood work  Subjective   Lobo Carmen is a 28 y o  female being seen today for her obstetrical visit  She is at 16w4d gestation  Patient reports no bleeding, no contractions, no cramping and no leaking  Fetal movement: not appreciated yet  Patient states she is doing well overall  Level II with MFM scheduled for April  Still needs to go for AFP  BP readings at home averaging 110-120/70-80  Taking PNV and aspirin  Denies HA, n/v, reflux, abdominal pain, and swelling  Urine dip showed trace protein  Menstrual History:  OB History        2    Para   1    Term   1       0    AB   0    Living   1       SAB   0    IAB   0    Ectopic   0    Multiple   0    Live Births   1           Obstetric Comments   1 C/S - IOL for hypertension, failed induction x2            Menarche age: N/A  Patient's last menstrual period was 2022 (exact date)  The following portions of the patient's history were reviewed and updated as appropriate: allergies, current medications, past family history, past medical history, past social history, past surgical history and problem list     Review of Systems  Pertinent items are noted in HPI       Objective     /90 (BP Location: Left arm, Patient Position: Sitting, Cuff Size: Adult)   Ht 5' 1" (1 549 m)   Wt 130 kg (287 lb)   LMP 2022 (Exact Date)   BMI 54 23 kg/m²   Uterine Size: size equals dates   Pelvic Exam:

## 2023-04-03 ENCOUNTER — APPOINTMENT (OUTPATIENT)
Dept: LAB | Facility: CLINIC | Age: 33
End: 2023-04-03

## 2023-04-03 ENCOUNTER — ROUTINE PRENATAL (OUTPATIENT)
Dept: OBGYN CLINIC | Facility: CLINIC | Age: 33
End: 2023-04-03

## 2023-04-03 VITALS
SYSTOLIC BLOOD PRESSURE: 120 MMHG | HEIGHT: 61 IN | BODY MASS INDEX: 54.53 KG/M2 | WEIGHT: 288.8 LBS | DIASTOLIC BLOOD PRESSURE: 82 MMHG

## 2023-04-03 DIAGNOSIS — O10.919 CHRONIC HYPERTENSION AFFECTING PREGNANCY: ICD-10-CM

## 2023-04-03 DIAGNOSIS — Z3A.18 18 WEEKS GESTATION OF PREGNANCY: Primary | ICD-10-CM

## 2023-04-03 DIAGNOSIS — Z36.9 ANTENATAL SCREENING ENCOUNTER: ICD-10-CM

## 2023-04-04 NOTE — PROGRESS NOTES
Patient seen and evaluated denies any vaginal bleeding or pelvic pain, started to feel fetal movement, denies any headache was complaining of episode of acid reflux that improved with diet modification   Alpha-fetoprotein done today results still pending  Patient is interested in repeat  section we will schedule repeat  section 12 weeks  Delivery timing will be dependent on blood pressure as of now chronic hypertension off medication  Instructed to continue baby aspirin until 36 weeks  Return precautions given to continue current management and return to office in 2 to 3 weeks for blood pressure check and OB visit

## 2023-04-06 LAB
2ND TRIMESTER 4 SCREEN SERPL-IMP: NORMAL
AFP ADJ MOM SERPL: 0.96
AFP INTERP AMN-IMP: NORMAL
AFP INTERP SERPL-IMP: NORMAL
AFP INTERP SERPL-IMP: NORMAL
AFP SERPL-MCNC: 29.7 NG/ML
AGE AT DELIVERY: 33.2 YR
GA METHOD: NORMAL
GA: 18 WEEKS
IDDM PATIENT QL: NO
MULTIPLE PREGNANCY: NO
NEURAL TUBE DEFECT RISK FETUS: NORMAL %

## 2023-04-20 ENCOUNTER — APPOINTMENT (OUTPATIENT)
Dept: LAB | Facility: MEDICAL CENTER | Age: 33
End: 2023-04-20

## 2023-04-20 DIAGNOSIS — O10.919 CHRONIC HYPERTENSION AFFECTING PREGNANCY: ICD-10-CM

## 2023-04-20 LAB
ALBUMIN SERPL BCP-MCNC: 3 G/DL (ref 3.5–5)
ALP SERPL-CCNC: 69 U/L (ref 46–116)
ALT SERPL W P-5'-P-CCNC: 20 U/L (ref 12–78)
ANION GAP SERPL CALCULATED.3IONS-SCNC: 6 MMOL/L (ref 4–13)
AST SERPL W P-5'-P-CCNC: 13 U/L (ref 5–45)
BASOPHILS # BLD AUTO: 0.03 THOUSANDS/ΜL (ref 0–0.1)
BASOPHILS NFR BLD AUTO: 0 % (ref 0–1)
BILIRUB SERPL-MCNC: 0.2 MG/DL (ref 0.2–1)
BUN SERPL-MCNC: 6 MG/DL (ref 5–25)
CALCIUM ALBUM COR SERPL-MCNC: 10 MG/DL (ref 8.3–10.1)
CALCIUM SERPL-MCNC: 9.2 MG/DL (ref 8.3–10.1)
CHLORIDE SERPL-SCNC: 106 MMOL/L (ref 96–108)
CO2 SERPL-SCNC: 23 MMOL/L (ref 21–32)
CREAT SERPL-MCNC: 0.57 MG/DL (ref 0.6–1.3)
CREAT UR-MCNC: 260 MG/DL
EOSINOPHIL # BLD AUTO: 0.1 THOUSAND/ΜL (ref 0–0.61)
EOSINOPHIL NFR BLD AUTO: 1 % (ref 0–6)
ERYTHROCYTE [DISTWIDTH] IN BLOOD BY AUTOMATED COUNT: 13.2 % (ref 11.6–15.1)
GFR SERPL CREATININE-BSD FRML MDRD: 123 ML/MIN/1.73SQ M
GLUCOSE P FAST SERPL-MCNC: 87 MG/DL (ref 65–99)
HCT VFR BLD AUTO: 36.5 % (ref 34.8–46.1)
HGB BLD-MCNC: 12 G/DL (ref 11.5–15.4)
IMM GRANULOCYTES # BLD AUTO: 0.04 THOUSAND/UL (ref 0–0.2)
IMM GRANULOCYTES NFR BLD AUTO: 1 % (ref 0–2)
LYMPHOCYTES # BLD AUTO: 2.14 THOUSANDS/ΜL (ref 0.6–4.47)
LYMPHOCYTES NFR BLD AUTO: 25 % (ref 14–44)
MCH RBC QN AUTO: 29.8 PG (ref 26.8–34.3)
MCHC RBC AUTO-ENTMCNC: 32.9 G/DL (ref 31.4–37.4)
MCV RBC AUTO: 91 FL (ref 82–98)
MONOCYTES # BLD AUTO: 0.5 THOUSAND/ΜL (ref 0.17–1.22)
MONOCYTES NFR BLD AUTO: 6 % (ref 4–12)
NEUTROPHILS # BLD AUTO: 5.75 THOUSANDS/ΜL (ref 1.85–7.62)
NEUTS SEG NFR BLD AUTO: 67 % (ref 43–75)
NRBC BLD AUTO-RTO: 0 /100 WBCS
PLATELET # BLD AUTO: 203 THOUSANDS/UL (ref 149–390)
PMV BLD AUTO: 12.3 FL (ref 8.9–12.7)
POTASSIUM SERPL-SCNC: 4 MMOL/L (ref 3.5–5.3)
PROT SERPL-MCNC: 7.4 G/DL (ref 6.4–8.4)
PROT UR-MCNC: 17 MG/DL
PROT/CREAT UR: 0.07 MG/G{CREAT} (ref 0–0.1)
RBC # BLD AUTO: 4.03 MILLION/UL (ref 3.81–5.12)
SODIUM SERPL-SCNC: 135 MMOL/L (ref 135–147)
URATE SERPL-MCNC: 4 MG/DL (ref 2–7.5)
WBC # BLD AUTO: 8.56 THOUSAND/UL (ref 4.31–10.16)

## 2023-04-26 ENCOUNTER — ROUTINE PRENATAL (OUTPATIENT)
Facility: HOSPITAL | Age: 33
End: 2023-04-26

## 2023-04-26 VITALS
WEIGHT: 292.11 LBS | BODY MASS INDEX: 55.15 KG/M2 | DIASTOLIC BLOOD PRESSURE: 80 MMHG | HEIGHT: 61 IN | HEART RATE: 98 BPM | SYSTOLIC BLOOD PRESSURE: 130 MMHG

## 2023-04-26 DIAGNOSIS — Z36.86 ENCOUNTER FOR ANTENATAL SCREENING FOR CERVICAL LENGTH: ICD-10-CM

## 2023-04-26 DIAGNOSIS — O99.212 OBESITY AFFECTING PREGNANCY IN SECOND TRIMESTER: ICD-10-CM

## 2023-04-26 DIAGNOSIS — Z3A.21 21 WEEKS GESTATION OF PREGNANCY: Primary | ICD-10-CM

## 2023-04-26 DIAGNOSIS — O10.919 CHRONIC HYPERTENSION AFFECTING PREGNANCY: ICD-10-CM

## 2023-04-26 DIAGNOSIS — Z36.89 ENCOUNTER FOR FETAL ANATOMIC SURVEY: ICD-10-CM

## 2023-04-26 PROBLEM — R03.0 ELEVATED BLOOD-PRESSURE READING, WITHOUT DIAGNOSIS OF HYPERTENSION: Status: ACTIVE | Noted: 2023-04-26

## 2023-04-26 PROBLEM — O99.210 OBESITY AFFECTING PREGNANCY: Status: ACTIVE | Noted: 2023-02-24

## 2023-04-26 NOTE — LETTER
"April 26, 2023     Jaqueline Monday, [address] [place]    Patient: Storm Baron   YOB: 1990   Date of Visit: 4/26/2023       Dear Dr Stone Ramos:    Thank you for referring Storm Baron to me for evaluation  Below are my notes for this consultation  If you have questions, please do not hesitate to call me  I look forward to following your patient along with you  Sincerely,        Sandy Will MD        CC: No Recipients  Sandy Will MD  4/26/2023  2:48 PM  Signed  [address]: Storm Baron was seen today at 21w2d for anatomic survey and cervical length screening ultrasound  See ultrasound report under \"OB Procedures\" tab    Please don't hesitate to contact our office with any concerns or questions   -Sandy Will MD          "

## 2023-04-26 NOTE — PROGRESS NOTES
Ultrasound Probe Disinfection    A transvaginal ultrasound was performed  Prior to use, disinfection was performed with High Level Disinfection Process (Trophon)  Probe serial number A5: [id number] was used        Rueben December 04/26/23  1:34 PM

## 2023-04-26 NOTE — PROGRESS NOTES
"[address]: Pastor Ford was seen today at 21w2d for anatomic survey and cervical length screening ultrasound  See ultrasound report under \"OB Procedures\" tab    Please don't hesitate to contact our office with any concerns or questions   -Fouzia Huizar MD      "

## 2023-04-27 RX ORDER — LABETALOL 100 MG/1
TABLET, FILM COATED ORAL
Qty: 180 TABLET | Refills: 1 | Status: SHIPPED | OUTPATIENT
Start: 2023-04-27

## 2023-05-05 ENCOUNTER — ROUTINE PRENATAL (OUTPATIENT)
Dept: OBGYN CLINIC | Facility: CLINIC | Age: 33
End: 2023-05-05

## 2023-05-05 VITALS — BODY MASS INDEX: 54.75 KG/M2 | HEIGHT: 61 IN | WEIGHT: 290 LBS

## 2023-05-05 DIAGNOSIS — Z34.92 PRENATAL CARE IN SECOND TRIMESTER: Primary | ICD-10-CM

## 2023-05-06 NOTE — PROGRESS NOTES
Patient seen evaluated denies any vaginal bleeding ruptures of membrane or contraction, patient is having good fetal movement, patient denies any headache blurry vision or epigastric pain  Patient blood pressure is well controlled without medication patient instructed to take blood pressure once daily negative time blood pressure pattern in pregnancy reviewed and discussed with patient goal of blood pressure to be less than 140/90 patient aware usually blood pressure would elevate in the third trimester  To continue aspirin and prenatal vitamin   precaution given to continue current management follow-up with MFM as scheduled and to to be seen in 3 to 4 weeks for OB care  Signs and symptoms of preeclampsia reviewed

## 2023-05-27 ENCOUNTER — OFFICE VISIT (OUTPATIENT)
Dept: URGENT CARE | Facility: CLINIC | Age: 33
End: 2023-05-27

## 2023-05-27 VITALS
OXYGEN SATURATION: 98 % | HEART RATE: 96 BPM | TEMPERATURE: 97.5 F | WEIGHT: 289 LBS | HEIGHT: 61 IN | RESPIRATION RATE: 16 BRPM | BODY MASS INDEX: 54.56 KG/M2

## 2023-05-27 DIAGNOSIS — S65.511A LACERATION OF BLOOD VESSEL OF LEFT INDEX FINGER, INITIAL ENCOUNTER: Primary | ICD-10-CM

## 2023-05-27 RX ORDER — CEPHALEXIN 500 MG/1
500 CAPSULE ORAL EVERY 12 HOURS SCHEDULED
Qty: 6 CAPSULE | Refills: 0 | Status: SHIPPED | OUTPATIENT
Start: 2023-05-27 | End: 2023-05-30

## 2023-05-27 NOTE — PROGRESS NOTES
"  [facility]        NAME: Roula Jim is a 28 y o  female  : 1990    MRN: [id number]  DATE: May 27, 2023  TIME: 11:06 AM    Assessment and Plan   Laceration of blood vessel of left index finger, initial encounter [S65 511A]  1  Laceration of blood vessel of left index finger, initial encounter  Laceration repair    cephalexin (KEFLEX) 500 mg capsule        Laceration repair    Date/Time: 2023 10:05 AM    Performed by: Reine Saint, MD  Authorized by: Reine Saint, MD  Consent: The procedure was performed in an emergent situation  Verbal consent obtained  Risks and benefits: risks, benefits and alternatives were discussed  Consent given by: patient  Patient understanding: patient states understanding of the procedure being performed  Required items: required blood products, implants, devices, and special equipment available  Time out: Immediately prior to procedure a \"time out\" was called to verify the correct patient, procedure, equipment, support staff and site/side marked as required  Body area: upper extremity  Location details: right index finger  Laceration length: 1 cm  Foreign bodies: no foreign bodies  Tendon involvement: none      Procedure Details:  Preparation: Patient was prepped and draped in the usual sterile fashion  Amount of cleaning: standard  Debridement: none  Degree of undermining: none  Skin closure: glue (Tourniquet applied)  Approximation: close  Approximation difficulty: simple  Patient tolerance: patient tolerated the procedure well with no immediate complications  Cleaning details: other particulate matter        Patient Instructions     Follow up with PCP in 3-5 days  Proceed to  ER if symptoms worsen  Chief Complaint     Chief Complaint   Patient presents with   • Laceration     Last Tdap- 2019  Cut L index finger with a butter knife  Takes baby Asprin- bleeding was not stopping  Occurred today            History of Present Illness " 70-year-old right-hand-dominant female presents today due to a left index finger laceration sustained about 1 hour ago while trying to cut a bagel with a bagel knife  Tdap last received about 4 years ago  Review of Systems   Review of Systems   Constitutional: Negative for chills and fever  HENT: Negative for congestion and rhinorrhea  Respiratory: Negative for cough, shortness of breath and wheezing  Cardiovascular: Negative for chest pain  Gastrointestinal: Negative for abdominal pain and nausea  Musculoskeletal: Negative for arthralgias  Skin: Negative for rash  Neurological: Negative for dizziness and headaches           Current Medications       Current Outpatient Medications:   •  aspirin 81 mg chewable tablet, Chew 81 mg daily, Disp: , Rfl:   •  cephalexin (KEFLEX) 500 mg capsule, Take 1 capsule (500 mg total) by mouth every 12 (twelve) hours for 3 days, Disp: 6 capsule, Rfl: 0  •  labetalol (NORMODYNE) 100 mg tablet, TAKE 1 TABLET BY MOUTH TWICE A DAY, Disp: 180 tablet, Rfl: 1  •  Prenatal Vit-Fe Fumarate-FA (PRENATAL VITAMIN PO), Take by mouth, Disp: , Rfl:     Current Allergies     Allergies as of 2023 - Reviewed 2023   Allergen Reaction Noted   • Shellfish-derived products - food allergy Anaphylaxis 2018   • Other Eye Swelling 2018            The following portions of the patient's history were reviewed and updated as appropriate: allergies, current medications, past family history, past medical history, past social history, past surgical history and problem list      Past Medical History:   Diagnosis Date   • Hypertension     pre eclampsia   • Seasonal allergies    • Varicella     as a child       Past Surgical History:   Procedure Laterality Date   •  SECTION  2019   • WISDOM TOOTH EXTRACTION         Family History   Problem Relation Age of Onset   • Diabetes Mother    • Hypertension Mother    • COPD Mother    • Hyperlipidemia Father    • "No Known Problems Sister    • No Known Problems Daughter    • Heart attack Maternal Grandmother    • Heart attack Maternal Grandfather    • Dementia Paternal Grandmother    • Dementia Paternal Grandfather          Medications have been verified  Objective   Pulse 96   Temp 97 5 °F (36 4 °C)   Resp 16   Ht 5' 1\" (1 549 m)   Wt 131 kg (289 lb)   LMP 11/28/2022 (Exact Date)   SpO2 98%   BMI 54 61 kg/m²   Patient's last menstrual period was 11/28/2022 (exact date)  Physical Exam     Physical Exam  Vitals and nursing note reviewed  Constitutional:       General: She is in acute distress  Appearance: Normal appearance  She is not ill-appearing, toxic-appearing or diaphoretic  HENT:      Head: Normocephalic and atraumatic  Eyes:      General:         Right eye: No discharge  Left eye: No discharge  Conjunctiva/sclera: Conjunctivae normal    Pulmonary:      Effort: Pulmonary effort is normal    Musculoskeletal:         General: Tenderness and signs of injury present  No swelling  Skin:     General: Skin is warm  Findings: Lesion (1 cm linear laceration near the DIP volar aspect of the left index finger) present  No erythema  Neurological:      General: No focal deficit present  Mental Status: She is alert and oriented to person, place, and time  Psychiatric:         Mood and Affect: Mood normal          Behavior: Behavior normal          Thought Content:  Thought content normal          Judgment: Judgment normal                    "

## 2023-06-02 ENCOUNTER — ROUTINE PRENATAL (OUTPATIENT)
Dept: OBGYN CLINIC | Facility: CLINIC | Age: 33
End: 2023-06-02

## 2023-06-02 VITALS — DIASTOLIC BLOOD PRESSURE: 86 MMHG | WEIGHT: 293 LBS | SYSTOLIC BLOOD PRESSURE: 124 MMHG | BODY MASS INDEX: 55.97 KG/M2

## 2023-06-02 DIAGNOSIS — Z98.891 HISTORY OF CESAREAN SECTION: Primary | ICD-10-CM

## 2023-06-02 DIAGNOSIS — Z34.82 ENCOUNTER FOR SUPERVISION OF OTHER NORMAL PREGNANCY IN SECOND TRIMESTER: ICD-10-CM

## 2023-06-02 DIAGNOSIS — Z3A.26 26 WEEKS GESTATION OF PREGNANCY: ICD-10-CM

## 2023-06-02 NOTE — PROGRESS NOTES
28 y o   female at 30w1d (Estimated Date of Delivery: 23) for PNV  Pre-Di Vitals    Flowsheet Row Most Recent Value   Prenatal Assessment    Movement Present   Prenatal Vitals    Blood Pressure 124/86   Weight - Scale 134 kg (296 lb 3 2 oz)   Urine Albumin/Glucose    Dilation/Effacement/Station    Vaginal Drainage    Edema         TWG: Not found  Pt is a transfer from Dr Vianey Shelley repeat c/s, no sterilization  Scheduled for  at 07 with Dr Jonathan Gamble  Pt has not met criteria for dx of cHTN  She was monitoring BP at home and none over 140/90  BP in the office have been 130/80  Hx of pre-E, discussed pre-E precautions    Ordered 26 wk labs

## 2023-06-02 NOTE — PROGRESS NOTES
Patient is a transfer from Dr Kandi Holley  She does have questions about medication Labetaol  States she has never had a blood pressure at home over 140/90 and is not sure why she was told to take this medication       Urine protein neg/ glucose neg

## 2023-06-12 ENCOUNTER — APPOINTMENT (OUTPATIENT)
Dept: LAB | Facility: MEDICAL CENTER | Age: 33
End: 2023-06-12
Payer: COMMERCIAL

## 2023-06-12 DIAGNOSIS — Z34.82 ENCOUNTER FOR SUPERVISION OF OTHER NORMAL PREGNANCY IN SECOND TRIMESTER: ICD-10-CM

## 2023-06-12 LAB
ERYTHROCYTE [DISTWIDTH] IN BLOOD BY AUTOMATED COUNT: 13.2 % (ref 11.6–15.1)
GLUCOSE 1H P 50 G GLC PO SERPL-MCNC: 127 MG/DL (ref 40–134)
HCT VFR BLD AUTO: 36.1 % (ref 34.8–46.1)
HGB BLD-MCNC: 11.7 G/DL (ref 11.5–15.4)
MCH RBC QN AUTO: 29.8 PG (ref 26.8–34.3)
MCHC RBC AUTO-ENTMCNC: 32.4 G/DL (ref 31.4–37.4)
MCV RBC AUTO: 92 FL (ref 82–98)
PLATELET # BLD AUTO: 187 THOUSANDS/UL (ref 149–390)
PMV BLD AUTO: 12.2 FL (ref 8.9–12.7)
RBC # BLD AUTO: 3.92 MILLION/UL (ref 3.81–5.12)
TREPONEMA PALLIDUM IGG+IGM AB [PRESENCE] IN SERUM OR PLASMA BY IMMUNOASSAY: NORMAL
WBC # BLD AUTO: 8.51 THOUSAND/UL (ref 4.31–10.16)

## 2023-06-12 PROCEDURE — 82950 GLUCOSE TEST: CPT

## 2023-06-12 PROCEDURE — 85027 COMPLETE CBC AUTOMATED: CPT

## 2023-06-12 PROCEDURE — 36415 COLL VENOUS BLD VENIPUNCTURE: CPT

## 2023-06-12 PROCEDURE — 86780 TREPONEMA PALLIDUM: CPT

## 2023-06-15 ENCOUNTER — ULTRASOUND (OUTPATIENT)
Facility: HOSPITAL | Age: 33
End: 2023-06-15
Payer: COMMERCIAL

## 2023-06-15 ENCOUNTER — ROUTINE PRENATAL (OUTPATIENT)
Dept: OBGYN CLINIC | Facility: CLINIC | Age: 33
End: 2023-06-15
Payer: COMMERCIAL

## 2023-06-15 VITALS
HEART RATE: 82 BPM | DIASTOLIC BLOOD PRESSURE: 72 MMHG | WEIGHT: 293 LBS | SYSTOLIC BLOOD PRESSURE: 132 MMHG | BODY MASS INDEX: 55.32 KG/M2 | HEIGHT: 61 IN

## 2023-06-15 VITALS — BODY MASS INDEX: 56.31 KG/M2 | DIASTOLIC BLOOD PRESSURE: 84 MMHG | WEIGHT: 293 LBS | SYSTOLIC BLOOD PRESSURE: 142 MMHG

## 2023-06-15 DIAGNOSIS — B37.2 YEAST DERMATITIS: ICD-10-CM

## 2023-06-15 DIAGNOSIS — E66.01 MATERNAL MORBID OBESITY, ANTEPARTUM (HCC): ICD-10-CM

## 2023-06-15 DIAGNOSIS — Z98.891 HISTORY OF CESAREAN SECTION: Primary | ICD-10-CM

## 2023-06-15 DIAGNOSIS — Z36.89 ENCOUNTER FOR ULTRASOUND TO CHECK FETAL GROWTH: ICD-10-CM

## 2023-06-15 DIAGNOSIS — O10.919 CHRONIC HYPERTENSION AFFECTING PREGNANCY: Primary | ICD-10-CM

## 2023-06-15 DIAGNOSIS — Z23 NEED FOR TDAP VACCINATION: ICD-10-CM

## 2023-06-15 DIAGNOSIS — Z3A.28 28 WEEKS GESTATION OF PREGNANCY: ICD-10-CM

## 2023-06-15 DIAGNOSIS — O99.210 MATERNAL MORBID OBESITY, ANTEPARTUM (HCC): ICD-10-CM

## 2023-06-15 DIAGNOSIS — Z36.2 ENCOUNTER FOR FOLLOW-UP ULTRASOUND OF FETAL ANATOMY: ICD-10-CM

## 2023-06-15 DIAGNOSIS — O09.291 HX OF PREECLAMPSIA, PRIOR PREGNANCY, CURRENTLY PREGNANT, FIRST TRIMESTER: ICD-10-CM

## 2023-06-15 DIAGNOSIS — O10.919 CHRONIC HYPERTENSION AFFECTING PREGNANCY: ICD-10-CM

## 2023-06-15 PROCEDURE — 76816 OB US FOLLOW-UP PER FETUS: CPT | Performed by: OBSTETRICS & GYNECOLOGY

## 2023-06-15 PROCEDURE — 90715 TDAP VACCINE 7 YRS/> IM: CPT | Performed by: OBSTETRICS & GYNECOLOGY

## 2023-06-15 PROCEDURE — PNV: Performed by: OBSTETRICS & GYNECOLOGY

## 2023-06-15 PROCEDURE — 99213 OFFICE O/P EST LOW 20 MIN: CPT | Performed by: OBSTETRICS & GYNECOLOGY

## 2023-06-15 PROCEDURE — 90471 IMMUNIZATION ADMIN: CPT | Performed by: OBSTETRICS & GYNECOLOGY

## 2023-06-15 RX ORDER — NYSTATIN 100000 [USP'U]/G
POWDER TOPICAL 2 TIMES DAILY
Qty: 30 G | Refills: 0 | Status: SHIPPED | OUTPATIENT
Start: 2023-06-15

## 2023-06-15 NOTE — PROGRESS NOTES
This is a 35 y o   at 28w3d who presents for return OB visit  Denies contractions, leakage, bleeding  Endorses fetal movement  Has noticed a rash underneath her c/s scar and on the bilateral crural folds  Occasionally itchy  BP: 142/84 TWlb   CHTN: Was prescribed labetalol but had not yet started as her BP has been improved  Had pre-E with her last pregnancy, no dx of CHTN between pregnancies but had elevated BP at 16 and 20 weeks  Today BP slightly elevated  Will likely need to start labetalol regularly - pt seeing MFM today, will see what her BP is there and at her next visit here  Last visit was normotensive  28 wk labs reviewed  TDAP offered and accepted  Rhogam not indicated  [address] reviewed  Consent signed  Full code  OK with blood transfusion   Prior c/s: repeat scheduled   Declined sterilization ( planning vasectomy)  Plans on formula feeding  F/up 2 wk

## 2023-06-15 NOTE — PROGRESS NOTES
Pt due for red folder/tdap and breast pump today  She states she has a rash near her c/s scar that she would like looked at  \  Neg urine dip  She declines a breast pump and Tdap was given in the left deltoid with no difficulty

## 2023-06-15 NOTE — PROGRESS NOTES
"[address]: Ms Addie Joe was seen today for fetal growth and followup missed anatomy ultrasound  See ultrasound report under \"OB Procedures\" tab  The time spent on this established patient on the encounter date included 5 minutes previsit service time reviewing records and precharting, 5 minutes face-to-face service time counseling regarding results and coordinating care, and  4 minutes charting, totalling 14 minutes  Please don't hesitate to contact our office with any concerns or questions    Alin Coy MD      "

## 2023-06-15 NOTE — PATIENT INSTRUCTIONS
Thank you for choosing us for your  care today  If you have any questions about your ultrasound or care, please do not hesitate to contact us or your primary obstetrician  Some general instructions for your pregnancy are:    Exercise: Aim for 22 minutes per day (150 minutes per week) of regular exercise  Walking is great! Nutrition: Choose healthy sources of calcium, iron, and protein  Learn about Preeclampsia: preeclampsia is a common, serious high blood pressure complication in pregnancy  A blood pressure of 029HYXP (systolic or top number) or 11NZLG (diastolic or bottom number) is not normal and needs evaluation by your doctor  Aspirin is sometimes prescribed in early pregnancy to prevent preeclampsia in women with risk factors - ask your obstetrician if you should be on this medication  For more resources, visit:  [employer]  If you smoke, try to reduce how many cigarettes you smoke or try to quit completely  Do not vape  Other warning signs to watch out for in pregnancy or postpartum: chest pain, obstructed breathing or shortness of breath, seizures, thoughts of hurting yourself or your baby, bleeding, a painful or swollen leg, fever, or headache (see [employer] POST-BIRTH Warning Signs campaign)  If these happen call 911  Itching is also not normal in pregnancy and if you experience this, especially over your hands and feet, potentially worse at night, notify your doctors

## 2023-07-05 ENCOUNTER — ROUTINE PRENATAL (OUTPATIENT)
Dept: OBGYN CLINIC | Facility: CLINIC | Age: 33
End: 2023-07-05

## 2023-07-05 VITALS — WEIGHT: 293 LBS | SYSTOLIC BLOOD PRESSURE: 130 MMHG | DIASTOLIC BLOOD PRESSURE: 82 MMHG | BODY MASS INDEX: 56.87 KG/M2

## 2023-07-05 DIAGNOSIS — O99.210 MATERNAL MORBID OBESITY, ANTEPARTUM (HCC): ICD-10-CM

## 2023-07-05 DIAGNOSIS — Z3A.31 31 WEEKS GESTATION OF PREGNANCY: ICD-10-CM

## 2023-07-05 DIAGNOSIS — O10.919 CHRONIC HYPERTENSION AFFECTING PREGNANCY: Primary | ICD-10-CM

## 2023-07-05 DIAGNOSIS — E66.01 MATERNAL MORBID OBESITY, ANTEPARTUM (HCC): ICD-10-CM

## 2023-07-05 PROCEDURE — PNV: Performed by: PHYSICIAN ASSISTANT

## 2023-07-05 NOTE — PROGRESS NOTES
Pt is here for her 31w prenatal. pt denies any swelling,leakage,bleeding,pressure,and contractions. Pt doing well no concerns.

## 2023-07-20 ENCOUNTER — ROUTINE PRENATAL (OUTPATIENT)
Dept: OBGYN CLINIC | Facility: CLINIC | Age: 33
End: 2023-07-20

## 2023-07-20 VITALS — BODY MASS INDEX: 57.86 KG/M2 | SYSTOLIC BLOOD PRESSURE: 142 MMHG | WEIGHT: 293 LBS | DIASTOLIC BLOOD PRESSURE: 90 MMHG

## 2023-07-20 DIAGNOSIS — O10.919 CHRONIC HYPERTENSION AFFECTING PREGNANCY: Primary | ICD-10-CM

## 2023-07-20 DIAGNOSIS — O09.293 HX OF PREECLAMPSIA, PRIOR PREGNANCY, CURRENTLY PREGNANT, THIRD TRIMESTER: ICD-10-CM

## 2023-07-20 DIAGNOSIS — Z98.891 HISTORY OF CESAREAN SECTION: ICD-10-CM

## 2023-07-20 DIAGNOSIS — Z3A.33 33 WEEKS GESTATION OF PREGNANCY: ICD-10-CM

## 2023-07-20 PROCEDURE — PNV: Performed by: OBSTETRICS & GYNECOLOGY

## 2023-07-20 RX ORDER — LABETALOL 100 MG/1
100 TABLET, FILM COATED ORAL 2 TIMES DAILY
COMMUNITY
Start: 2023-07-20

## 2023-07-20 NOTE — PROGRESS NOTES
This is a 35 y.o.  at 33w3d who presents for return OB visit. No complaints. Denies contractions, leakage, bleeding. Endorses fetal movement   BP: 142/90 TWlb    CHTN: not on meds currently but BP trending up. Will start labetalol 100mg BID - pt has this at home (was previously prescribed but didn't start bc home readings were low). Discussed s/sx of elevated BP or low BP. OK to check BP at home if symptomatic. Will start weekly NSTs next week and also have weekly BP checks.    Prior c/s: repeat scheduled  F/up 2 wk

## 2023-07-21 ENCOUNTER — ULTRASOUND (OUTPATIENT)
Facility: HOSPITAL | Age: 33
End: 2023-07-21
Payer: COMMERCIAL

## 2023-07-21 VITALS
HEIGHT: 61 IN | DIASTOLIC BLOOD PRESSURE: 80 MMHG | WEIGHT: 293 LBS | HEART RATE: 117 BPM | SYSTOLIC BLOOD PRESSURE: 134 MMHG | BODY MASS INDEX: 55.32 KG/M2

## 2023-07-21 DIAGNOSIS — Z36.89 ENCOUNTER FOR ULTRASOUND TO ASSESS FETAL GROWTH: ICD-10-CM

## 2023-07-21 DIAGNOSIS — O10.919 CHRONIC HYPERTENSION AFFECTING PREGNANCY: Primary | ICD-10-CM

## 2023-07-21 DIAGNOSIS — Z36.2 ENCOUNTER FOR FOLLOW-UP ULTRASOUND OF FETAL ANATOMY: ICD-10-CM

## 2023-07-21 DIAGNOSIS — Z3A.33 33 WEEKS GESTATION OF PREGNANCY: ICD-10-CM

## 2023-07-21 PROBLEM — O36.60X0 FETAL MACROSOMIA DURING PREGNANCY: Status: ACTIVE | Noted: 2023-07-21

## 2023-07-21 PROCEDURE — 76819 FETAL BIOPHYS PROFIL W/O NST: CPT | Performed by: OBSTETRICS & GYNECOLOGY

## 2023-07-21 PROCEDURE — 76816 OB US FOLLOW-UP PER FETUS: CPT | Performed by: OBSTETRICS & GYNECOLOGY

## 2023-07-21 NOTE — PROGRESS NOTES
[address]: Nancy Martínez was seen today at 33w4d for fetal growth and followup missed anatomy ultrasound. See ultrasound report under "OB Procedures" tab.   Please don't hesitate to contact our office with any concerns or questions.  -Juan J Prabhakar MD

## 2023-07-21 NOTE — LETTER
2023     Chhaya Harris MD  [address]  [address]    Patient: Linsey Watts   YOB: 1990   Date of Visit: 2023       Dear Dr. Noemí Wood:    Thank you for referring Linsey Watts to me for evaluation. Below are my notes for this consultation. If you have questions, please do not hesitate to call me. I look forward to following your patient along with you.          Sincerely,         US 3 ZI        CC: No Recipients

## 2023-07-26 ENCOUNTER — ROUTINE PRENATAL (OUTPATIENT)
Dept: OBGYN CLINIC | Facility: CLINIC | Age: 33
End: 2023-07-26
Payer: COMMERCIAL

## 2023-07-26 VITALS — BODY MASS INDEX: 58.5 KG/M2 | DIASTOLIC BLOOD PRESSURE: 88 MMHG | WEIGHT: 293 LBS | SYSTOLIC BLOOD PRESSURE: 120 MMHG

## 2023-07-26 DIAGNOSIS — O34.219 HX OF CESAREAN SECTION COMPLICATING PREGNANCY: ICD-10-CM

## 2023-07-26 DIAGNOSIS — Z3A.34 34 WEEKS GESTATION OF PREGNANCY: ICD-10-CM

## 2023-07-26 DIAGNOSIS — O10.919 CHRONIC HYPERTENSION AFFECTING PREGNANCY: ICD-10-CM

## 2023-07-26 DIAGNOSIS — E66.01 MATERNAL MORBID OBESITY, ANTEPARTUM (HCC): Primary | ICD-10-CM

## 2023-07-26 DIAGNOSIS — O09.293 HX OF PREECLAMPSIA, PRIOR PREGNANCY, CURRENTLY PREGNANT, THIRD TRIMESTER: ICD-10-CM

## 2023-07-26 DIAGNOSIS — O99.210 MATERNAL MORBID OBESITY, ANTEPARTUM (HCC): Primary | ICD-10-CM

## 2023-07-26 DIAGNOSIS — O36.63X0 FETAL MACROSOMIA DURING PREGNANCY IN THIRD TRIMESTER, SINGLE OR UNSPECIFIED FETUS: ICD-10-CM

## 2023-07-26 PROCEDURE — PNV: Performed by: OBSTETRICS & GYNECOLOGY

## 2023-07-26 PROCEDURE — 59025 FETAL NON-STRESS TEST: CPT | Performed by: OBSTETRICS & GYNECOLOGY

## 2023-07-26 NOTE — PROGRESS NOTES
35 y.o.  female at 34w2d (Estimated Date of Delivery: 23) for PNV. Pre-Di Vitals    Flowsheet Row Most Recent Value   Prenatal Assessment    Fetal Heart Rate 150   Movement Present   Prenatal Vitals    Blood Pressure 120/88   Weight - Scale 140 kg (309 lb 9.6 oz)   Urine Albumin/Glucose    Dilation/Effacement/Station    Vaginal Drainage    Edema         TW.434 kg (20 lb 12.8 oz)    Leakage of fluid: no  Vaginal bleeding: no  Contractions/Cramping: no  Fetal movement: yes  Chronic hypertension -now controlled with labetalol 100 mg twice daily. Normotensive blood pressure today in office. Recent  center ultrasound performed on 2023 notable for suspected macrosomia. EFW 5 pounds 15 ounces, 90%. Recommendation for delivery at 37.0 to 39.0 weeks due to her chronic hypertension not requiring antihypertensive therapy for control.   -Repeat  section moved to Thursday, 2023 at 1 PM with Dr. Steph Mensah, patient will be 37.3 weeks gestation at time of delivery. NST performed today. Reactive and reassuring. RTO in 1 weeks.

## 2023-07-26 NOTE — PROGRESS NOTES
Routine prenatal 34 weeks, NST today. Pt is well, would like to discuss moving  to 37 weeks. Denies vb,lof, and ctx.

## 2023-08-02 ENCOUNTER — ROUTINE PRENATAL (OUTPATIENT)
Dept: OBGYN CLINIC | Facility: CLINIC | Age: 33
End: 2023-08-02
Payer: COMMERCIAL

## 2023-08-02 VITALS — WEIGHT: 293 LBS | DIASTOLIC BLOOD PRESSURE: 82 MMHG | SYSTOLIC BLOOD PRESSURE: 138 MMHG | BODY MASS INDEX: 58.95 KG/M2

## 2023-08-02 DIAGNOSIS — O10.919 CHRONIC HYPERTENSION AFFECTING PREGNANCY: Primary | ICD-10-CM

## 2023-08-02 DIAGNOSIS — Z98.891 HISTORY OF CESAREAN SECTION: ICD-10-CM

## 2023-08-02 DIAGNOSIS — O36.63X0 FETAL MACROSOMIA DURING PREGNANCY IN THIRD TRIMESTER, SINGLE OR UNSPECIFIED FETUS: ICD-10-CM

## 2023-08-02 DIAGNOSIS — Z3A.35 35 WEEKS GESTATION OF PREGNANCY: ICD-10-CM

## 2023-08-02 DIAGNOSIS — O09.293 HX OF PREECLAMPSIA, PRIOR PREGNANCY, CURRENTLY PREGNANT, THIRD TRIMESTER: ICD-10-CM

## 2023-08-02 DIAGNOSIS — E66.01 MATERNAL MORBID OBESITY, ANTEPARTUM (HCC): ICD-10-CM

## 2023-08-02 DIAGNOSIS — O99.210 MATERNAL MORBID OBESITY, ANTEPARTUM (HCC): ICD-10-CM

## 2023-08-02 PROCEDURE — 59025 FETAL NON-STRESS TEST: CPT | Performed by: OBSTETRICS & GYNECOLOGY

## 2023-08-02 PROCEDURE — PNV: Performed by: OBSTETRICS & GYNECOLOGY

## 2023-08-02 RX ORDER — LABETALOL 100 MG/1
100 TABLET, FILM COATED ORAL 2 TIMES DAILY
Qty: 60 TABLET | Refills: 2 | Status: SHIPPED | OUTPATIENT
Start: 2023-08-02 | End: 2023-10-31

## 2023-08-02 NOTE — PROGRESS NOTES
35 y.o.  female at 35w2d (Estimated Date of Delivery: 23) for PNV. Pre-Di Vitals    Flowsheet Row Most Recent Value   Prenatal Assessment    Fetal Heart Rate 140   Movement Present   Prenatal Vitals    Blood Pressure 138/82   Weight - Scale 142 kg (312 lb)   Urine Albumin/Glucose    Dilation/Effacement/Station    Vaginal Drainage    Edema         TWG: 10.5 kg (23 lb 3.2 oz)    Leakage of fluid: no  Vaginal bleeding: no  Contractions/Cramping: no  Fetal movement: yes  Chronic hypertension -currently controlled with labetalol 100 mg twice daily. Refill of medication provided to patient today. BP in office normotensive at 138/82. Patient denies any headaches, blurry vision, and epigastric pain.   -Scheduled RLTCS on 2023 at 1 PM with Dr. Jaclyn Garrison. NST performed today. Reactive and reassuring. RTO in 1 weeks.

## 2023-08-02 NOTE — PROGRESS NOTES
Patient is requesting a refill on her BP medication, pharmacy on file verified. Patient denies any contractions, fluid leaking or pressure.

## 2023-08-10 ENCOUNTER — ROUTINE PRENATAL (OUTPATIENT)
Dept: OBGYN CLINIC | Facility: CLINIC | Age: 33
End: 2023-08-10

## 2023-08-10 VITALS — DIASTOLIC BLOOD PRESSURE: 78 MMHG | BODY MASS INDEX: 59.03 KG/M2 | SYSTOLIC BLOOD PRESSURE: 120 MMHG | WEIGHT: 293 LBS

## 2023-08-10 DIAGNOSIS — Z34.83 PRENATAL CARE, SUBSEQUENT PREGNANCY IN THIRD TRIMESTER: Primary | ICD-10-CM

## 2023-08-10 DIAGNOSIS — O10.919 CHRONIC HYPERTENSION AFFECTING PREGNANCY: ICD-10-CM

## 2023-08-10 DIAGNOSIS — Z3A.36 36 WEEKS GESTATION OF PREGNANCY: ICD-10-CM

## 2023-08-10 PROCEDURE — 87491 CHLMYD TRACH DNA AMP PROBE: CPT | Performed by: PHYSICIAN ASSISTANT

## 2023-08-10 PROCEDURE — 87591 N.GONORRHOEAE DNA AMP PROB: CPT | Performed by: PHYSICIAN ASSISTANT

## 2023-08-10 PROCEDURE — PNV: Performed by: PHYSICIAN ASSISTANT

## 2023-08-10 PROCEDURE — 87150 DNA/RNA AMPLIFIED PROBE: CPT | Performed by: PHYSICIAN ASSISTANT

## 2023-08-10 NOTE — PROGRESS NOTES
35 y.o.  female at 36w3d (Estimated Date of Delivery: 23) for PNV. Pre-Di Vitals    Flowsheet Row Most Recent Value   Prenatal Assessment    Movement Present   Prenatal Vitals    Blood Pressure 120/78   Weight - Scale 142 kg (312 lb 6.4 oz)   Urine Albumin/Glucose    Dilation/Effacement/Station    Vaginal Drainage    Edema         TWG: 10.7 kg (23 lb 9.6 oz)    Leakage of fluid: no  Vaginal bleeding: no  Contractions/Cramping: no  Fetal movement: yes    GBS and Gc/chlamyd cultures done today  BP stable on Labetalol BID  NST reactive  C/s scheduled for next week    RTO in 1 weeks.

## 2023-08-10 NOTE — PROGRESS NOTES
Routine prenatal 36 weeks GBS and g/c. Pt is well, states there are no concerns. Denies vb,lof, and ctx.  Urine dip test neg protein/ neg glucose

## 2023-08-11 LAB
C TRACH DNA SPEC QL NAA+PROBE: NEGATIVE
N GONORRHOEA DNA SPEC QL NAA+PROBE: NEGATIVE

## 2023-08-13 LAB — GP B STREP DNA SPEC QL NAA+PROBE: NEGATIVE

## 2023-08-15 NOTE — PRE-PROCEDURE INSTRUCTIONS
Phone call to patient for pre-operative instructions prior to     Pt was instructed to arrive @1100, 2 hours before her scheduled 1300 OR time  (If the patient is RH negative, she should be told to come in 2 1/2 hours before scheduled OR)    Pt instructed to remain NPO after midnight. *This includes gum, water and hard candy  *If patient takes AM meds (e.g.hypertensive meds) they may take these meds with a sip of water. *This should not include medications like prenatal vitamins Aspirin or colace. *Type 1 diabetics should stay on their normal insulin regime or as ordered by their doctor. Type 2 on insulin should take 1/2 dose of their overnight insulin or as instructed by their doctor  *Pt told to take her labetalol in the am  Pt should brush their teeth as usual.    Pt was instructed to buy and use a pre-surgical wash containing chlorhexidine the night before and the morning of her scheduled  and to wear clean clothing after the wash. Pt instructed not to shave operative area prior to surgery. Pt was asked not to wear any jewelry and to leave all of her valuables at home. Pt was asked to leave all of her larger bags and suitcases in the car to be brought in after she is assigned a post partum room. Pt was informed that she may have 1 support person in the OR/PACU area and of the current visiting policies.

## 2023-08-16 ENCOUNTER — ANESTHESIA EVENT (INPATIENT)
Dept: LABOR AND DELIVERY | Facility: HOSPITAL | Age: 33
End: 2023-08-16
Payer: COMMERCIAL

## 2023-08-16 PROBLEM — Z3A.37 37 WEEKS GESTATION OF PREGNANCY: Status: ACTIVE | Noted: 2023-04-26

## 2023-08-16 PROCEDURE — NC001 PR NO CHARGE: Performed by: OBSTETRICS & GYNECOLOGY

## 2023-08-16 NOTE — ASSESSMENT & PLAN NOTE
Upon admission severe range blood pressures noted , home dose Labetalol 100 mg  Admission cbc, cmp and P:C wnl  : Labetalol increased to 870BY BID  Systolic (90UJM), HWX:390 , Min:126 , MICHELE:110    Diastolic (59UZY), YL, Min:61, Max:82

## 2023-08-16 NOTE — H&P
[address] Á[place]ela [place] 35 y.o. female MRN: [id number]  Unit/Bed#:  Encounter: [id number]    Assessment: 35 y.o.  at 37w2d admitted for a scheduled Repeat Low transverse  Section secondary to Chronic Hypertension. Upon Admission patient was noted to have severe range blood pressures and Pre-Eclampsia labs are pending at time of dictation. FHT:  reactive   Clinical EFW: 90%ile w/ AC 99%ile at  33wks GBS status: negative    Postpartum contraception plan: OCPs bridge to   vasectomy    Plan:   * 37 weeks gestation of pregnancy  Assessment & Plan  Admit   IV Fluids   CBC, RPR, Type and Screen   NPO   Neuroaxial Anesthesia   3g Ancef for Surgical PPX    Chronic hypertension affecting pregnancy  Assessment & Plan  Currently on Labetalol 100mg BID daily  Delivery at 37 wks recommended ,  Upon admission severe range blood pressures noted   Systolic (42IPU), ZZH:457 , Min:153 , IGK:856    Diastolic (05DFX), OXZ:81, Min:94, Max:97    F/u Admission cbc, cmp and P:C  For CHTN exacerbation vs Pre-Eclampsia    Maternal morbid obesity, antepartum ([address])  Assessment & Plan  BMI 59.03  DVT ppx: SCDs & Lovenox 60 BID     Fetal macrosomia during pregnancy  Assessment & Plan  EFW at 33wks 90%ile with AC 99%ile    Hx of preeclampsia, prior pregnancy, currently pregnant, third trimester  Assessment & Plan  F/U admn Pre-E Labs  Monitor BP    History of  section  Assessment & Plan   due to failed induction        Discussed case and plan w/ Dr. Michelle Olivarez      Chief Complaint: Ready to meet baby   HPI: Jose Luis Kaur is a 35 y.o. Santos Walt with an COLIN of 2023, by Last Menstrual Period at 37w2d who is being admitted for scheduled RTLCS. She denies having uterine contractions, has no LOF, and reports no VB. She states she has felt good FM. Lidia Phalen She denies headaches, blurry vision, chest pain, shortness of breath , ruq pain/epigastric pain . She reports mild lower extremity swelling. Patient Active Problem List   Diagnosis   • History of  section   • Obesity affecting pregnancy   • Hx of preeclampsia, prior pregnancy, currently pregnant, third trimester   • 37 weeks gestation of pregnancy   • Chronic hypertension affecting pregnancy   • Maternal morbid obesity, antepartum ([address])   • Fetal macrosomia during pregnancy       Baby complications/comments: Fetal Macrosomia     Review of Systems   Constitutional: Negative for chills and fever. HENT: Negative for ear pain and sore throat. Eyes: Negative for pain and visual disturbance. Respiratory: Negative for cough and shortness of breath. Cardiovascular: Negative for chest pain and palpitations. Gastrointestinal: Negative for abdominal pain and vomiting. Genitourinary: Negative for dysuria and hematuria. Musculoskeletal: Negative for arthralgias and back pain. Skin: Negative for color change and rash. Neurological: Negative for seizures and syncope. All other systems reviewed and are negative.       OB Hx:  OB History    Para Term  AB Living   2 1 1 0 0 1   SAB IAB Ectopic Multiple Live Births   0 0 0 0 1      # Outcome Date GA Lbr Lucio/2nd Weight Sex Delivery Anes PTL Lv   2 Current            1 Term 19 37w0d  3430 g (7 lb 9 oz) F CS-Unspec Spinal N DIXON      Birth Comments: Induced  for 2 days      Complications: Failure to Progress in Second Stage, Pre-eclampsia      Obstetric Comments   1 C/S - IOL for hypertension, failed induction x2       Past Medical Hx:  Past Medical History:   Diagnosis Date   • Hypertension     pre eclampsia   • Seasonal allergies    • Varicella     as a child       Past Surgical hx:  Past Surgical History:   Procedure Laterality Date   •  SECTION  2019   • WISDOM TOOTH EXTRACTION         Social Hx:  Alcohol use: no  Tobacco use: no  Other substance use: no    Other: none    Allergies   Allergen Reactions   • Shellfish-Derived Products - Food Allergy Anaphylaxis     Has epi pen , numbness mouth and tongue   • Other Eye Swelling     Seasonal        Medications Prior to Admission   Medication   • aspirin 81 mg chewable tablet   • labetalol (NORMODYNE) 100 mg tablet   • Prenatal Vit-Fe Fumarate-FA (PRENATAL VITAMIN PO)   • nystatin (MYCOSTATIN) powder       Objective:  Temp:  [98.1 °F (36.7 °C)] 98.1 °F (36.7 °C)  HR:  [78-91] 88  Resp:  [22] 22  BP: (153-171)/(94-97) 163/97  Body mass index is 61.53 kg/m². Physical Exam:  Chaperone present   Physical Exam  Constitutional:       Appearance: She is obese. HENT:      Head: Normocephalic and atraumatic. Eyes:      Extraocular Movements: Extraocular movements intact. Cardiovascular:      Rate and Rhythm: Normal rate and regular rhythm. Heart sounds: Normal heart sounds. No murmur heard. No friction rub. No gallop. Pulmonary:      Effort: Pulmonary effort is normal. No respiratory distress. Breath sounds: No wheezing or rhonchi. Abdominal:      General: There is no distension. Palpations: Abdomen is soft. Tenderness: There is no abdominal tenderness. There is no guarding. Musculoskeletal:         General: No swelling or tenderness. Normal range of motion. Cervical back: Normal range of motion. Neurological:      General: No focal deficit present. Mental Status: She is alert. Skin:     General: Skin is warm. Findings: No rash.    Psychiatric:         Mood and Affect: Mood normal.            FHT:  Baseline Rate: 130 bpm  Variability: Moderate 6-25 bpm  Accelerations: 15 x 15 or greater, At variable times  Decelerations: None    TOCO:   Contraction Frequency (minutes): 0  Contraction Quality: Not applicable    Lab Results   Component Value Date    WBC 8.81 08/17/2023    HGB 12.0 08/17/2023    HCT 35.6 08/17/2023     08/17/2023     Lab Results   Component Value Date    K 4.1 08/17/2023     08/17/2023    CO2 20 (L) 08/17/2023    BUN 7 08/17/2023 CREATININE 0.57 (L) 08/17/2023    AST 15 08/17/2023    ALT 11 08/17/2023     Prenatal Labs: Reviewed      Blood type: B positive   Antibody: negative   GBS: negative   HIV: nonreactive   Rubella: Immune  VDRL/RPR: Non reactive  HBsAg: Negative  Chlamydia: Negative  Gonorrhea: Negative  Diabetes 1 hour screen:127  3 hour glucose: NA  Platelets: 263  Hgb: 11.7  >2 Midnights  INPATIENT     Signature/Title: Danya Moya MD  Date: 8/17/2023  Time: 12:38 PM

## 2023-08-16 NOTE — ASSESSMENT & PLAN NOTE
• Routine postpartum care  • Encourage ambulation  • Encourage familial bonding  • Lactation support as needed  • Pain: Motrin/Tylenol around the clock, oxycodone if needed  • Pre-delivery Hgb 12 --> Post Delivery 9.6, venofer given  • Brown catheter: removed, voiding  • DVT Ppx: ambulation, SCDs , Lovenox 60 BID

## 2023-08-17 ENCOUNTER — ANESTHESIA (INPATIENT)
Dept: LABOR AND DELIVERY | Facility: HOSPITAL | Age: 33
End: 2023-08-17
Payer: COMMERCIAL

## 2023-08-17 ENCOUNTER — HOSPITAL ENCOUNTER (INPATIENT)
Facility: HOSPITAL | Age: 33
LOS: 2 days | Discharge: HOME | End: 2023-08-19
Attending: OBSTETRICS & GYNECOLOGY | Admitting: OBSTETRICS & GYNECOLOGY
Payer: COMMERCIAL

## 2023-08-17 DIAGNOSIS — O10.919 CHRONIC HYPERTENSION AFFECTING PREGNANCY: ICD-10-CM

## 2023-08-17 DIAGNOSIS — O34.219 PREVIOUS CESAREAN SECTION COMPLICATING PREGNANCY: ICD-10-CM

## 2023-08-17 DIAGNOSIS — Z3A.37 37 WEEKS GESTATION OF PREGNANCY: Primary | ICD-10-CM

## 2023-08-17 DIAGNOSIS — O10.919 CHRONIC HYPERTENSION DURING PREGNANCY, ANTEPARTUM: ICD-10-CM

## 2023-08-17 DIAGNOSIS — Z98.891 STATUS POST REPEAT LOW TRANSVERSE CESAREAN SECTION: ICD-10-CM

## 2023-08-17 LAB
ABO GROUP BLD: NORMAL
ALBUMIN SERPL BCP-MCNC: 3.5 G/DL (ref 3.5–5)
ALP SERPL-CCNC: 104 U/L (ref 34–104)
ALT SERPL W P-5'-P-CCNC: 11 U/L (ref 7–52)
ANION GAP SERPL CALCULATED.3IONS-SCNC: 10 MMOL/L
AST SERPL W P-5'-P-CCNC: 15 U/L (ref 13–39)
BASE EXCESS BLDCOA CALC-SCNC: -2.6 MMOL/L (ref 3–11)
BASE EXCESS BLDCOV CALC-SCNC: -2 MMOL/L (ref 1–9)
BILIRUB SERPL-MCNC: 0.3 MG/DL (ref 0.2–1)
BLD GP AB SCN SERPL QL: NEGATIVE
BUN SERPL-MCNC: 7 MG/DL (ref 5–25)
CALCIUM SERPL-MCNC: 8.8 MG/DL (ref 8.4–10.2)
CHLORIDE SERPL-SCNC: 106 MMOL/L (ref 96–108)
CO2 SERPL-SCNC: 20 MMOL/L (ref 21–32)
CREAT SERPL-MCNC: 0.57 MG/DL (ref 0.6–1.3)
CREAT UR-MCNC: 222.7 MG/DL
ERYTHROCYTE [DISTWIDTH] IN BLOOD BY AUTOMATED COUNT: 13.8 % (ref 11.6–15.1)
GFR SERPL CREATININE-BSD FRML MDRD: 122 ML/MIN/1.73SQ M
GLUCOSE SERPL-MCNC: 83 MG/DL (ref 65–140)
HCO3 BLDCOA-SCNC: 24.9 MMOL/L (ref 17.3–27.3)
HCO3 BLDCOV-SCNC: 24.2 MMOL/L (ref 12.2–28.6)
HCT VFR BLD AUTO: 35.6 % (ref 34.8–46.1)
HGB BLD-MCNC: 12 G/DL (ref 11.5–15.4)
HOLD SPECIMEN: NORMAL
MCH RBC QN AUTO: 29.8 PG (ref 26.8–34.3)
MCHC RBC AUTO-ENTMCNC: 33.7 G/DL (ref 31.4–37.4)
MCV RBC AUTO: 88 FL (ref 82–98)
O2 CT VFR BLDCOA CALC: 5 ML/DL
OXYHGB MFR BLDCOA: 20.5 %
OXYHGB MFR BLDCOV: 51.3 %
PCO2 BLDCOA: 52.6 MM[HG] (ref 30–60)
PCO2 BLDCOV: 46.2 MM HG (ref 27–43)
PH BLDCOA: 7.29 [PH] (ref 7.23–7.43)
PH BLDCOV: 7.34 [PH] (ref 7.19–7.49)
PLATELET # BLD AUTO: 171 THOUSANDS/UL (ref 149–390)
PMV BLD AUTO: 12.6 FL (ref 8.9–12.7)
PO2 BLDCOA: 13.2 MM HG (ref 5–25)
PO2 BLDCOV: 22.6 MM HG (ref 15–45)
POTASSIUM SERPL-SCNC: 4.1 MMOL/L (ref 3.5–5.3)
PROT SERPL-MCNC: 6.9 G/DL (ref 6.4–8.4)
PROT UR-MCNC: 23 MG/DL
PROT/CREAT UR: 0.1 MG/G{CREAT} (ref 0–0.1)
RBC # BLD AUTO: 4.03 MILLION/UL (ref 3.81–5.12)
RH BLD: POSITIVE
SAO2 % BLDCOV: 12.6 ML/DL
SODIUM SERPL-SCNC: 136 MMOL/L (ref 135–147)
SPECIMEN EXPIRATION DATE: NORMAL
TREPONEMA PALLIDUM IGG+IGM AB [PRESENCE] IN SERUM OR PLASMA BY IMMUNOASSAY: NORMAL
WBC # BLD AUTO: 8.81 THOUSAND/UL (ref 4.31–10.16)

## 2023-08-17 PROCEDURE — 94762 N-INVAS EAR/PLS OXIMTRY CONT: CPT

## 2023-08-17 PROCEDURE — 80053 COMPREHEN METABOLIC PANEL: CPT

## 2023-08-17 PROCEDURE — 85027 COMPLETE CBC AUTOMATED: CPT

## 2023-08-17 PROCEDURE — 84156 ASSAY OF PROTEIN URINE: CPT

## 2023-08-17 PROCEDURE — 59510 CESAREAN DELIVERY: CPT | Performed by: OBSTETRICS & GYNECOLOGY

## 2023-08-17 PROCEDURE — 86850 RBC ANTIBODY SCREEN: CPT

## 2023-08-17 PROCEDURE — 82570 ASSAY OF URINE CREATININE: CPT

## 2023-08-17 PROCEDURE — 86900 BLOOD TYPING SEROLOGIC ABO: CPT

## 2023-08-17 PROCEDURE — 82805 BLOOD GASES W/O2 SATURATION: CPT | Performed by: OBSTETRICS & GYNECOLOGY

## 2023-08-17 PROCEDURE — 86780 TREPONEMA PALLIDUM: CPT

## 2023-08-17 PROCEDURE — 88307 TISSUE EXAM BY PATHOLOGIST: CPT | Performed by: PATHOLOGY

## 2023-08-17 PROCEDURE — 86901 BLOOD TYPING SEROLOGIC RH(D): CPT

## 2023-08-17 RX ORDER — HYDROMORPHONE HCL/PF 1 MG/ML
0.5 SYRINGE (ML) INJECTION EVERY 4 HOURS PRN
Status: DISCONTINUED | OUTPATIENT
Start: 2023-08-17 | End: 2023-08-19 | Stop reason: HOSPADM

## 2023-08-17 RX ORDER — LABETALOL 100 MG/1
100 TABLET, FILM COATED ORAL ONCE
Status: COMPLETED | OUTPATIENT
Start: 2023-08-17 | End: 2023-08-17

## 2023-08-17 RX ORDER — IBUPROFEN 600 MG/1
600 TABLET ORAL EVERY 6 HOURS
Status: DISCONTINUED | OUTPATIENT
Start: 2023-08-18 | End: 2023-08-19 | Stop reason: HOSPADM

## 2023-08-17 RX ORDER — CEFAZOLIN SODIUM 2 G/50ML
2000 SOLUTION INTRAVENOUS ONCE
Status: COMPLETED | OUTPATIENT
Start: 2023-08-17 | End: 2023-08-17

## 2023-08-17 RX ORDER — MORPHINE SULFATE 0.5 MG/ML
INJECTION, SOLUTION EPIDURAL; INTRATHECAL; INTRAVENOUS AS NEEDED
Status: DISCONTINUED | OUTPATIENT
Start: 2023-08-17 | End: 2023-08-17

## 2023-08-17 RX ORDER — DEXAMETHASONE SODIUM PHOSPHATE 10 MG/ML
INJECTION, SOLUTION INTRAMUSCULAR; INTRAVENOUS AS NEEDED
Status: DISCONTINUED | OUTPATIENT
Start: 2023-08-17 | End: 2023-08-17

## 2023-08-17 RX ORDER — ACETAMINOPHEN 325 MG/1
650 TABLET ORAL EVERY 6 HOURS SCHEDULED
Status: DISCONTINUED | OUTPATIENT
Start: 2023-08-17 | End: 2023-08-17

## 2023-08-17 RX ORDER — ACETAMINOPHEN 325 MG/1
975 TABLET ORAL EVERY 6 HOURS SCHEDULED
Status: DISCONTINUED | OUTPATIENT
Start: 2023-08-18 | End: 2023-08-18

## 2023-08-17 RX ORDER — DOCUSATE SODIUM 100 MG/1
100 CAPSULE, LIQUID FILLED ORAL 2 TIMES DAILY
Status: DISCONTINUED | OUTPATIENT
Start: 2023-08-17 | End: 2023-08-19 | Stop reason: HOSPADM

## 2023-08-17 RX ORDER — OXYTOCIN/RINGER'S LACTATE 30/500 ML
PLASTIC BAG, INJECTION (ML) INTRAVENOUS CONTINUOUS PRN
Status: DISCONTINUED | OUTPATIENT
Start: 2023-08-17 | End: 2023-08-17

## 2023-08-17 RX ORDER — SODIUM CHLORIDE, SODIUM LACTATE, POTASSIUM CHLORIDE, CALCIUM CHLORIDE 600; 310; 30; 20 MG/100ML; MG/100ML; MG/100ML; MG/100ML
125 INJECTION, SOLUTION INTRAVENOUS CONTINUOUS
Status: DISCONTINUED | OUTPATIENT
Start: 2023-08-17 | End: 2023-08-19 | Stop reason: HOSPADM

## 2023-08-17 RX ORDER — ONDANSETRON 2 MG/ML
INJECTION INTRAMUSCULAR; INTRAVENOUS AS NEEDED
Status: DISCONTINUED | OUTPATIENT
Start: 2023-08-17 | End: 2023-08-17

## 2023-08-17 RX ORDER — ENOXAPARIN SODIUM 100 MG/ML
60 INJECTION SUBCUTANEOUS EVERY 12 HOURS
Status: DISCONTINUED | OUTPATIENT
Start: 2023-08-17 | End: 2023-08-17

## 2023-08-17 RX ORDER — FENTANYL CITRATE 50 UG/ML
INJECTION, SOLUTION INTRAMUSCULAR; INTRAVENOUS AS NEEDED
Status: DISCONTINUED | OUTPATIENT
Start: 2023-08-17 | End: 2023-08-17

## 2023-08-17 RX ORDER — LABETALOL 200 MG/1
200 TABLET, FILM COATED ORAL EVERY 12 HOURS SCHEDULED
Status: DISCONTINUED | OUTPATIENT
Start: 2023-08-17 | End: 2023-08-19 | Stop reason: HOSPADM

## 2023-08-17 RX ORDER — DIPHENHYDRAMINE HYDROCHLORIDE 50 MG/ML
INJECTION INTRAMUSCULAR; INTRAVENOUS AS NEEDED
Status: DISCONTINUED | OUTPATIENT
Start: 2023-08-17 | End: 2023-08-17

## 2023-08-17 RX ORDER — OXYTOCIN/RINGER'S LACTATE 30/500 ML
62.5 PLASTIC BAG, INJECTION (ML) INTRAVENOUS ONCE
Status: COMPLETED | OUTPATIENT
Start: 2023-08-17 | End: 2023-08-17

## 2023-08-17 RX ORDER — ONDANSETRON 2 MG/ML
4 INJECTION INTRAMUSCULAR; INTRAVENOUS EVERY 8 HOURS PRN
Status: DISCONTINUED | OUTPATIENT
Start: 2023-08-17 | End: 2023-08-17

## 2023-08-17 RX ORDER — CALCIUM CARBONATE 500 MG/1
1000 TABLET, CHEWABLE ORAL DAILY PRN
Status: DISCONTINUED | OUTPATIENT
Start: 2023-08-17 | End: 2023-08-19 | Stop reason: HOSPADM

## 2023-08-17 RX ORDER — CITRIC ACID/SODIUM CITRATE 334-500MG
30 SOLUTION, ORAL ORAL ONCE
Status: COMPLETED | OUTPATIENT
Start: 2023-08-17 | End: 2023-08-17

## 2023-08-17 RX ORDER — FENTANYL CITRATE/PF 50 MCG/ML
50 SYRINGE (ML) INJECTION
Status: CANCELLED | OUTPATIENT
Start: 2023-08-17

## 2023-08-17 RX ORDER — LABETALOL HYDROCHLORIDE 5 MG/ML
20 INJECTION, SOLUTION INTRAVENOUS ONCE
Status: DISCONTINUED | OUTPATIENT
Start: 2023-08-17 | End: 2023-08-17

## 2023-08-17 RX ORDER — NALOXONE HYDROCHLORIDE 0.4 MG/ML
0.1 INJECTION, SOLUTION INTRAMUSCULAR; INTRAVENOUS; SUBCUTANEOUS
Status: ACTIVE | OUTPATIENT
Start: 2023-08-17 | End: 2023-08-18

## 2023-08-17 RX ORDER — NALBUPHINE HYDROCHLORIDE 10 MG/ML
5 INJECTION, SOLUTION INTRAMUSCULAR; INTRAVENOUS; SUBCUTANEOUS
Status: COMPLETED | OUTPATIENT
Start: 2023-08-17 | End: 2023-08-17

## 2023-08-17 RX ORDER — LABETALOL 200 MG/1
200 TABLET, FILM COATED ORAL EVERY 12 HOURS SCHEDULED
Status: DISCONTINUED | OUTPATIENT
Start: 2023-08-17 | End: 2023-08-17

## 2023-08-17 RX ORDER — CEFAZOLIN SODIUM 1 G/50ML
1000 SOLUTION INTRAVENOUS ONCE
Status: COMPLETED | OUTPATIENT
Start: 2023-08-17 | End: 2023-08-17

## 2023-08-17 RX ORDER — LABETALOL 100 MG/1
100 TABLET, FILM COATED ORAL EVERY 12 HOURS SCHEDULED
Status: DISCONTINUED | OUTPATIENT
Start: 2023-08-17 | End: 2023-08-17

## 2023-08-17 RX ORDER — KETOROLAC TROMETHAMINE 30 MG/ML
INJECTION, SOLUTION INTRAMUSCULAR; INTRAVENOUS AS NEEDED
Status: DISCONTINUED | OUTPATIENT
Start: 2023-08-17 | End: 2023-08-17

## 2023-08-17 RX ORDER — ENOXAPARIN SODIUM 100 MG/ML
60 INJECTION SUBCUTANEOUS EVERY 12 HOURS SCHEDULED
Status: DISCONTINUED | OUTPATIENT
Start: 2023-08-18 | End: 2023-08-19 | Stop reason: HOSPADM

## 2023-08-17 RX ORDER — KETOROLAC TROMETHAMINE 30 MG/ML
30 INJECTION, SOLUTION INTRAMUSCULAR; INTRAVENOUS EVERY 6 HOURS SCHEDULED
Status: DISPENSED | OUTPATIENT
Start: 2023-08-17 | End: 2023-08-18

## 2023-08-17 RX ORDER — LABETALOL 100 MG/1
100 TABLET, FILM COATED ORAL ONCE
Status: CANCELLED | OUTPATIENT
Start: 2023-08-17

## 2023-08-17 RX ORDER — LABETALOL 200 MG/1
200 TABLET, FILM COATED ORAL EVERY 12 HOURS SCHEDULED
Status: DISCONTINUED | OUTPATIENT
Start: 2023-08-18 | End: 2023-08-17

## 2023-08-17 RX ORDER — BUPIVACAINE HYDROCHLORIDE 7.5 MG/ML
INJECTION, SOLUTION INTRASPINAL AS NEEDED
Status: DISCONTINUED | OUTPATIENT
Start: 2023-08-17 | End: 2023-08-17

## 2023-08-17 RX ADMIN — SODIUM CITRATE AND CITRIC ACID MONOHYDRATE 30 ML: 500; 334 SOLUTION ORAL at 13:00

## 2023-08-17 RX ADMIN — KETOROLAC TROMETHAMINE 30 MG: 30 INJECTION, SOLUTION INTRAMUSCULAR; INTRAVENOUS at 13:53

## 2023-08-17 RX ADMIN — LABETALOL HYDROCHLORIDE 200 MG: 200 TABLET, FILM COATED ORAL at 21:17

## 2023-08-17 RX ADMIN — PHENYLEPHRINE HYDROCHLORIDE 50 MCG/MIN: 10 INJECTION INTRAVENOUS at 13:15

## 2023-08-17 RX ADMIN — MORPHINE SULFATE 0.2 MG: 0.5 INJECTION, SOLUTION EPIDURAL; INTRATHECAL; INTRAVENOUS at 13:14

## 2023-08-17 RX ADMIN — Medication 62.5 MILLI-UNITS/MIN: at 14:32

## 2023-08-17 RX ADMIN — LABETALOL HYDROCHLORIDE 100 MG: 100 TABLET, FILM COATED ORAL at 15:51

## 2023-08-17 RX ADMIN — KETOROLAC TROMETHAMINE 30 MG: 30 INJECTION, SOLUTION INTRAMUSCULAR; INTRAVENOUS at 20:26

## 2023-08-17 RX ADMIN — DEXAMETHASONE SODIUM PHOSPHATE 10 MG: 10 INJECTION, SOLUTION INTRAMUSCULAR; INTRAVENOUS at 13:16

## 2023-08-17 RX ADMIN — CEFAZOLIN SODIUM 2000 MG: 2 SOLUTION INTRAVENOUS at 13:05

## 2023-08-17 RX ADMIN — FENTANYL CITRATE 30 MCG: 50 INJECTION, SOLUTION INTRAMUSCULAR; INTRAVENOUS at 13:38

## 2023-08-17 RX ADMIN — DIPHENHYDRAMINE HYDROCHLORIDE 25 MG: 50 INJECTION, SOLUTION INTRAMUSCULAR; INTRAVENOUS at 13:43

## 2023-08-17 RX ADMIN — SODIUM CHLORIDE, SODIUM LACTATE, POTASSIUM CHLORIDE, AND CALCIUM CHLORIDE 1000 ML: .6; .31; .03; .02 INJECTION, SOLUTION INTRAVENOUS at 11:30

## 2023-08-17 RX ADMIN — ACETAMINOPHEN 650 MG: 325 TABLET, FILM COATED ORAL at 18:24

## 2023-08-17 RX ADMIN — NALBUPHINE HYDROCHLORIDE 5 MG: 10 INJECTION, SOLUTION INTRAMUSCULAR; INTRAVENOUS; SUBCUTANEOUS at 18:26

## 2023-08-17 RX ADMIN — Medication 250 MILLI-UNITS/MIN: at 13:36

## 2023-08-17 RX ADMIN — CEFAZOLIN SODIUM 1000 MG: 1 SOLUTION INTRAVENOUS at 13:05

## 2023-08-17 RX ADMIN — SODIUM CHLORIDE, SODIUM LACTATE, POTASSIUM CHLORIDE, AND CALCIUM CHLORIDE 125 ML/HR: .6; .31; .03; .02 INJECTION, SOLUTION INTRAVENOUS at 14:32

## 2023-08-17 RX ADMIN — ONDANSETRON 4 MG: 2 INJECTION INTRAMUSCULAR; INTRAVENOUS at 13:16

## 2023-08-17 RX ADMIN — NALBUPHINE HYDROCHLORIDE 5 MG: 10 INJECTION, SOLUTION INTRAMUSCULAR; INTRAVENOUS; SUBCUTANEOUS at 15:15

## 2023-08-17 RX ADMIN — SODIUM CHLORIDE, SODIUM LACTATE, POTASSIUM CHLORIDE, AND CALCIUM CHLORIDE: .6; .31; .03; .02 INJECTION, SOLUTION INTRAVENOUS at 13:05

## 2023-08-17 RX ADMIN — FENTANYL CITRATE 20 MCG: 50 INJECTION INTRAMUSCULAR; INTRAVENOUS at 13:14

## 2023-08-17 RX ADMIN — DOCUSATE SODIUM 100 MG: 100 CAPSULE, LIQUID FILLED ORAL at 18:24

## 2023-08-17 RX ADMIN — BUPIVACAINE HYDROCHLORIDE IN DEXTROSE 1.6 ML: 7.5 INJECTION, SOLUTION SUBARACHNOID at 13:14

## 2023-08-17 NOTE — ANESTHESIA POSTPROCEDURE EVALUATION
Post-Op Assessment Note    CV Status:  Stable  Pain Score: 0    Pain management: adequate     Mental Status:  Alert and awake   Hydration Status:  Euvolemic   PONV Controlled:  Controlled   Airway Patency:  Patent      Post Op Vitals Reviewed: Yes      Staff: CRNA         No notable events documented.     BP  107/69   Temp   98.0   Pulse  73   Resp   18   SpO2   95

## 2023-08-17 NOTE — PLAN OF CARE
Problem: PAIN - ADULT  Goal: Verbalizes/displays adequate comfort level or baseline comfort level  Description: Interventions:  - Encourage patient to monitor pain and request assistance  - Assess pain using appropriate pain scale  - Administer analgesics based on type and severity of pain and evaluate response  - Implement non-pharmacological measures as appropriate and evaluate response  - Consider cultural and social influences on pain and pain management  - Notify physician/advanced practitioner if interventions unsuccessful or patient reports new pain  Outcome: Progressing     Problem: INFECTION - ADULT  Goal: Absence or prevention of progression during hospitalization  Description: INTERVENTIONS:  - Assess and monitor for signs and symptoms of infection  - Monitor lab/diagnostic results  - Monitor all insertion sites, i.e. indwelling lines, tubes, and drains  - Monitor endotracheal if appropriate and nasal secretions for changes in amount and color  - Friendship appropriate cooling/warming therapies per order  - Administer medications as ordered  - Instruct and encourage patient and family to use good hand hygiene technique    Outcome: Progressing  Goal: Absence of fever/infection during neutropenic period  Description: INTERVENTIONS:  - Monitor WBC    Outcome: Progressing     Problem: SAFETY ADULT  Goal: Patient will remain free of falls  Description: INTERVENTIONS:  - Educate patient/family on patient safety including physical limitations  - Instruct patient to call for assistance with activity   - Keep Call bell within reach  - Keep bed low and locked with side rails adjusted as appropriate  - Keep care items and personal belongings within reach  - Initiate and maintain comfort rounds  - Offer Toileting in advance of need    Outcome: Progressing     Problem: DISCHARGE PLANNING  Goal: Discharge to home or other facility with appropriate resources  Description: INTERVENTIONS:  - Identify barriers to discharge w/patient and caregiver  - Arrange for needed discharge resources and transportation as appropriate  - Identify discharge learning needs (meds, wound care, etc.)  - Refer to Case Management Department for coordinating discharge planning if the patient needs post-hospital services based on physician/advanced practitioner order or complex needs related to functional status, cognitive ability, or social support system  Outcome: Progressing     Problem: Knowledge Deficit  Goal: Patient/family/caregiver demonstrates understanding of disease process, treatment plan, medications, and discharge instructions  Description: Complete learning assessment and assess knowledge base.   Interventions:  - Provide teaching at level of understanding  - Provide teaching via preferred learning methods  Outcome: Progressing     Problem: POSTPARTUM  Goal: Experiences normal postpartum course  Description: INTERVENTIONS:  - Monitor maternal vital signs  - Assess uterine involution and lochia  Outcome: Progressing  Goal: Appropriate maternal -  bonding  Description: INTERVENTIONS:  - Identify family support  - Assess for appropriate maternal/infant bonding   -Encourage maternal/infant bonding opportunities  - Referral to  or  as needed  Outcome: Progressing  Goal: Establishment of infant feeding pattern  Description: INTERVENTIONS:  - Assess breast/bottle feeding  - Refer to lactation as needed  Outcome: Progressing  Goal: Incision(s), wounds(s) or drain site(s) healing without S/S of infection  Description: INTERVENTIONS  - Assess and document dressing, incision, wound bed, drain sites and surrounding tissue  - Provide patient and family education  - Perform skin care/dressing changes dharmesh  Outcome: Progressing

## 2023-08-17 NOTE — OP NOTE
OPERATIVE REPORT  PATIENT NAME: Sonia Vogt    :  1990  MRN: [id number]  Pt Location: [facility] L&D OR ROOM 02    SURGERY DATE: 2023    Surgeon(s) and Role:     * Zackary Huntley MD - Primary     * Jayme Chandler MD - Assisting    Preop Diagnosis:  Pregnancy at 44w1d gestational age  Chronic hypertension during pregnancy, antepartum [O10.919]  Previous  section complicating pregnancy [P20.959]  Obesity   Suspected Fetal Macrosomia   History of Pre-Eclampsia in Prior pregnancy     Post-Op Diagnosis Codes:     * Chronic hypertension during pregnancy, antepartum [O10.919]     * Previous  section complicating pregnancy [H00.332]    Procedure(s) (LRB):   SECTION () REPEAT (N/A)    Specimen(s):  ID Type Source Tests Collected by Time Destination   1 :  Tissue (Placenta on Hold) OB Only Placenta TISSUE EXAM OB (PLACENTA) ONLY Riri Gómez MD 2023 1338    A :  Cord Blood Cord BLOOD GAS, VENOUS, CORD, BLOOD GAS, ARTERIAL, CORD Riri Gómez MD 2023 1335        Surgical QBL:  Surgical QBL (mL): 642 mL      Drains:  Urethral Catheter Double-lumen;Non-latex 16 Fr.  (Active)   Reasons to continue Urinary Catheter  Post-operative urological requirements 23 1316   Goal for Removal Remove POD#1 23 1316   Site Assessment Clean;Skin intact 23 1316   Brown Care Done 23 1316   Collection Container Standard drainage bag 23 1316   Number of days: 0       Anesthesia Type:   Spinal     Operative Indications:  Chronic hypertension during pregnancy, antepartum [O10.919]  Previous  section complicating pregnancy [H18.892]      Andrey Group Classification System:  No Multiple pregnancy, No Transverse or oblique lie, No Breech lie, Gestational age is > or =37 weeks, Multiparous, Previous uterine scar +  is ANDREY GROUP 5      Complications:   None    Brief History   35 y.o. Taya Garcia at 37w2d was admitted for a scheduled Repeat Low transverse  Section secondary to Chronic Hypertension. Upon Admission patient was noted to have severe range blood pressures. Pre-Eclampsia labs were within normal limits. Operative Findings:  1. Delivery of viable male on 23 at 1334, weight 9lbs 1.9oz;  Apgar scores of 5 at one minute and 9 at five minutes. Umbilical artery pH 5.099 (base excess -2.6)  2. Normal appearing placenta with centrally-inserted 3 vessel cord  3. Clear amniotic fluid  4. Grossly normal uterus, tubes, and ovaries    In the operating room the correct patient and procedures were identified. Spinal  anesthesia was adequately established. 3g IV was given for preoperative surgical prophylaxis. Patient was placed in the dorsal supine position with a left tilt of the hips. Fetal heart tones were confirmed to be 138bpm. Chlorohexadine was used to prep the vagina and perineum. A saldana catheter . Chloraprep was used to prep the abdomen. She was draped in the usual sterile fashion. Dot Ping was used to lift and position the pannus and improve visualization. Time out was performed with all in agreement. Time out was performed. Pfannenstiel  incision was made in the skin with a surgical scalpel and sharp dissection was carried out over subsequent layers of tissue down to the level of the fascia. The fascia was incised at the midline and the incision was extended bilaterally using the curved Ovalles scissors. The superior edge of the fascial incision was grasped with Kocher clamps and dissected off the underlying rectus muscles. The inferior edge of the fascial incision was grasped with Kocher clamps and the underlying rectus muscles  dissected off. The rectus muscles were then divided at midline and the peritoneum was identified and entered. The peritoneal incision was extended superiorly and inferiorly.  The Bladder blade was inserted and the vesicouterine peritoneum was identified and a transverse incision was made in the lower uterine segment using a new surgical blade. clear amniotic fluid was noted. The uterine incision was extended cephalad and caudal using blunt dissection. The surgeon's hand was placed into the uterine cavity. The fetal head was identified and elevated through the uterine incision with the assistance of fundal pressure. There was no nuchal cord noted. Gentle traction was applied in an attempt to deliver fetal head. Fetus was noted to change position during this maneuver. The fetal presenting part was then noted to have changed to breech position. The fetal pelvis was elevated to the level of the hysterotomy, at which time the surgeon's hands were placed with thumbs on the fetal sacrum and fingers on the fetal anterior superior iliac spines. The fetal pelvis was delivered, and the Pinard maneuver was performed to deliver the fetal legs. The fetus was then delivered to the level of the scapulae. The fetus was rotated to have the right arm anterior, and the Loveset maneuver was performed to deliver the right fetal arm. The fetus was rotated to have the left arm anterior, and the left fetal arm was delivered in the same fashion. The Mauriceau Therese Alu maneuver was performed to flex the fetal head, after which the fetal head spontaneously delivered. Following delayed cord clamping, the umbilical cord was doubly clamped and cut. The infant was then passed off the table to the awaiting  staff. Venous and arterial blood gas, cord blood, and portion of cord was obtained for analysis and routine blood testing. The placenta delivered with uterine massage and was noted to be intact with and had a central insertion of a three-vessel cord. The placenta was sent to storage. Oxytocin was administered by IV infusion to enhance uterine contraction. The uterus was exteriorized and cleared of all clots and remaining products of conception.       The hysterotomy was reapproximated using 0- Monocryl  in a running locked fashion. A second imbricating stitch with 0- Vicryl was applied. Hemostasis was achieved. The posterior cul-de-sac was cleared of all clots and products of conception. The uterus was replaced into the abdomen and the pericolic gutters were cleared of all clots. Hemostasis was once again confirmed at the hysterotomy. The fascia was reapproximated using PDS Plus 1 in a running nonlocked fashion. The subcutaneous tissue was irrigated and cleared of all clots and debris. Good hemostasis was notedwith Bovie electrocautery. The subcutaneous tissue was reapproximated in two layers. The first layer was closed in a running non locked  fashion with 0- Vicryl and the second layer was closed in similar fashion with 2-0 Plain. The skin incision was closed in a running subcuticular fashion with 4-0 Stratafix . Good hemostasis was noted. Exofin was applied. The uterus was expressed of clots. Patient tolerated the procedure well. All needle, sponge, and instrument counts were noted to be correct x 2 at the end of the procedure. Patient was transferred to the recovery room in stable condition. Dr. Myrna Diaz was present for the procedure.         Patient Disposition:  PACU         SIGNATURE: Jass Christensen MD  DATE: August 17, 2023  TIME: 6:06 PM

## 2023-08-17 NOTE — ANESTHESIA PREPROCEDURE EVALUATION
Procedure:   SECTION () REPEAT (Uterus)    Relevant Problems   CARDIO   (+) Chronic hypertension affecting pregnancy   (-) Angina at rest [facility])      ENDO   (-) Diabetes mellitus, type 2 (HCC)      GYN   (+) 37 weeks gestation of pregnancy      PULMONARY   (-) Asthma   (-) Chronic obstructive pulmonary disease (HCC)             Anesthesia Plan  ASA Score- 3     Anesthesia Type- spinal with ASA Monitors. Additional Monitors:   Airway Plan:           Plan Factors-    Chart reviewed. EKG reviewed. Existing labs reviewed. Patient summary reviewed. Patient is not a current smoker. Induction-     Postoperative Plan- Plan for postoperative opioid use.      Informed Consent-

## 2023-08-17 NOTE — PLAN OF CARE
Problem: PAIN - ADULT  Goal: Verbalizes/displays adequate comfort level or baseline comfort level  Description: Interventions:  - Encourage patient to monitor pain and request assistance  - Assess pain using appropriate pain scale  - Administer analgesics based on type and severity of pain and evaluate response  - Implement non-pharmacological measures as appropriate and evaluate response  - Consider cultural and social influences on pain and pain management  - Notify physician/advanced practitioner if interventions unsuccessful or patient reports new pain  Outcome: Progressing     Problem: INFECTION - ADULT  Goal: Absence or prevention of progression during hospitalization  Description: INTERVENTIONS:  - Assess and monitor for signs and symptoms of infection  - Monitor lab/diagnostic results  - Monitor all insertion sites, i.e. indwelling lines, tubes, and drains  - Monitor endotracheal if appropriate and nasal secretions for changes in amount and color  - [place] appropriate cooling/warming therapies per order  - Administer medications as ordered  - Instruct and encourage patient and family to use good hand hygiene technique    Outcome: Progressing  Goal: Absence of fever/infection during neutropenic period  Description: INTERVENTIONS:  - Monitor WBC    Outcome: Progressing     Problem: SAFETY ADULT  Goal: Patient will remain free of falls  Description: INTERVENTIONS:  - Educate patient/family on patient safety including physical limitations  - Instruct patient to call for assistance with activity   - Keep Call bell within reach  - Keep bed low and locked with side rails adjusted as appropriate  - Keep care items and personal belongings within reach  - Initiate and maintain comfort rounds  - Offer Toileting in advance of need    Outcome: Progressing     Problem: DISCHARGE PLANNING  Goal: Discharge to home or other facility with appropriate resources  Description: INTERVENTIONS:  - Identify barriers to discharge w/patient and caregiver  - Arrange for needed discharge resources and transportation as appropriate  - Identify discharge learning needs (meds, wound care, etc.)  - Refer to Case Management Department for coordinating discharge planning if the patient needs post-hospital services based on physician/advanced practitioner order or complex needs related to functional status, cognitive ability, or social support system  Outcome: Progressing     Problem: Knowledge Deficit  Goal: Patient/family/caregiver demonstrates understanding of disease process, treatment plan, medications, and discharge instructions  Description: Complete learning assessment and assess knowledge base.   Interventions:  - Provide teaching at level of understanding  - Provide teaching via preferred learning methods  Outcome: Progressing     Problem: BIRTH - VAGINAL/ SECTION  Goal: Fetal and maternal status remain reassuring during the birth process  Description: INTERVENTIONS:  - Monitor vital signs  - Monitor fetal heart rate  - Monitor uterine activity  - DVT prophylaxis  - Antibiotic prophylaxis  Outcome: Progressing  Goal: Emotionally satisfying birthing experience for mother/fetus  Description: Interventions:  - Assess, plan, implement and evaluate the nursing care given to the patient in labor  - Advocate the philosophy that each childbirth experience is a unique experience and support the family's chosen level of involvement and control during the labor process   - Actively participate in both the patient's and family's teaching of the birth process  - Consider cultural, Hoahaoism and age-specific factors and plan care for the patient in labor  Outcome: Progressing

## 2023-08-17 NOTE — ANESTHESIA PROCEDURE NOTES
Spinal Block    Patient location during procedure: OR  Reason for block: procedure for pain and at surgeon's request  Staffing  Performed by: Em Monterroso MD  Authorized by: Em Monetrroso MD    Preanesthetic Checklist  Completed: patient identified, IV checked, site marked, risks and benefits discussed, surgical consent, monitors and equipment checked, pre-op evaluation and timeout performed  Spinal Block  Patient position: sitting  Prep: ChloraPrep  Patient monitoring: cardiac monitor and frequent blood pressure checks  Approach: midline  Location: L3-4  Injection technique: single-shot  Needle  Needle type: pencil-tip   Needle gauge: 25 G  Needle length: 10 cm  Assessment  Sensory level: T4  Injection Assessment:  negative aspiration for heme, no paresthesia on injection and positive aspiration for clear CSF.   Post-procedure:  adhesive bandage applied, pressure dressing applied, secured with tape, site cleaned and sterile dressing applied

## 2023-08-17 NOTE — DISCHARGE SUMMARY
Obstetrics Discharge Summary   Rico Skinner 35 y.o. female MRN: [id number]  Unit/Bed#: LD PACU-01 Encounter: [id number]    Admission Date: 2023     Discharge Date: 23    Admitting Diagnoses:   Pregnancy at 37w3d gestational age  Chronic hypertension during pregnancy, antepartum [O10.919]  Previous  section complicating pregnancy [G82.136]  Obesity   Suspected Fetal Macrosomia   History of Pre-Eclampsia in Prior pregnancy     Discharge Diagnoses:   Same, delivered    Procedures:   repeat  section, low transverse incision    Admitting Attending: Dr. Jong Kline  Delivery Attending: Dr. Jong Kline  Discharge Attending: Dr. Luis Angel Rawls:   35 y.o. Reggie Brand at 43w1d was admitted for a scheduled Repeat Low transverse  Section secondary to Chronic Hypertension. Upon Admission patient was noted to have severe range blood pressures. Pre-Eclampsia labs were within normal limits. She underwent an uncomplicated repeat low transverse  section delivery on 2023 and delivered a viable male  at 46. APGARS were 5, 9 at 1 and 5 minutes, respectively. 's birth weight was 9lb 1.9oz. Placenta delivered without difficulty.  was admitted to the  nursery. Patient tolerated the procedure well and was transferred to recovery in stable condition. The patient's post partum course was remarkable for severe range blood pressures. her Home dose of labetalol was increased to 200mg BID. Kendrick Lennox Her preoperative hemoglobin was 12g/dL, postoperative was 9.6, venofer ordered. Her postoperative pain was well controlled with oral analgesics. On day of discharge, she was ambulating and able to reasonably perform all ADLs. Blood pressures were within normal limits. She was voiding and had appropriate bowel function. Pain was well controlled. She was discharged home on post-operative day #2 without complications.  Patient was instructed to follow up with her OB as an outpatient and was given appropriate warnings to call provider if she develops signs of infection or uncontrolled pain. She is bottle feeding . Mom's blood type is B positive, RhoGAM was not indicated      Complications:   None    Condition at discharge:   good     Provisions for Follow-Up Care:  See after visit summary for information related to follow-up care and any pertinent home health orders. Disposition:   Home    Planned Readmission:   No    Discharge Medications:   Please see AVS for a complete list of discharge medications. Discharge instructions :   Please see AVS for complete discharge instructions.

## 2023-08-18 LAB
ERYTHROCYTE [DISTWIDTH] IN BLOOD BY AUTOMATED COUNT: 14.2 % (ref 11.6–15.1)
HCT VFR BLD AUTO: 29.2 % (ref 34.8–46.1)
HGB BLD-MCNC: 9.6 G/DL (ref 11.5–15.4)
MCH RBC QN AUTO: 30.3 PG (ref 26.8–34.3)
MCHC RBC AUTO-ENTMCNC: 32.9 G/DL (ref 31.4–37.4)
MCV RBC AUTO: 92 FL (ref 82–98)
PLATELET # BLD AUTO: 169 THOUSANDS/UL (ref 149–390)
PMV BLD AUTO: 12.3 FL (ref 8.9–12.7)
RBC # BLD AUTO: 3.17 MILLION/UL (ref 3.81–5.12)
WBC # BLD AUTO: 11.51 THOUSAND/UL (ref 4.31–10.16)

## 2023-08-18 PROCEDURE — 85027 COMPLETE CBC AUTOMATED: CPT

## 2023-08-18 PROCEDURE — 99024 POSTOP FOLLOW-UP VISIT: CPT | Performed by: OBSTETRICS & GYNECOLOGY

## 2023-08-18 RX ORDER — OXYCODONE HYDROCHLORIDE 5 MG/1
5 TABLET ORAL EVERY 4 HOURS PRN
Status: DISCONTINUED | OUTPATIENT
Start: 2023-08-18 | End: 2023-08-19 | Stop reason: HOSPADM

## 2023-08-18 RX ORDER — OXYCODONE HYDROCHLORIDE 10 MG/1
10 TABLET ORAL EVERY 4 HOURS PRN
Status: DISCONTINUED | OUTPATIENT
Start: 2023-08-18 | End: 2023-08-19 | Stop reason: HOSPADM

## 2023-08-18 RX ORDER — NYSTATIN 100000 [USP'U]/G
POWDER TOPICAL 2 TIMES DAILY
Status: DISCONTINUED | OUTPATIENT
Start: 2023-08-18 | End: 2023-08-19 | Stop reason: HOSPADM

## 2023-08-18 RX ORDER — ACETAMINOPHEN 325 MG/1
975 TABLET ORAL EVERY 8 HOURS SCHEDULED
Status: DISCONTINUED | OUTPATIENT
Start: 2023-08-18 | End: 2023-08-19 | Stop reason: HOSPADM

## 2023-08-18 RX ADMIN — LABETALOL HYDROCHLORIDE 200 MG: 200 TABLET, FILM COATED ORAL at 20:52

## 2023-08-18 RX ADMIN — ACETAMINOPHEN 975 MG: 325 TABLET, FILM COATED ORAL at 00:21

## 2023-08-18 RX ADMIN — KETOROLAC TROMETHAMINE 30 MG: 30 INJECTION, SOLUTION INTRAMUSCULAR; INTRAVENOUS at 08:15

## 2023-08-18 RX ADMIN — KETOROLAC TROMETHAMINE 30 MG: 30 INJECTION, SOLUTION INTRAMUSCULAR; INTRAVENOUS at 02:29

## 2023-08-18 RX ADMIN — DOCUSATE SODIUM 100 MG: 100 CAPSULE, LIQUID FILLED ORAL at 18:16

## 2023-08-18 RX ADMIN — IRON SUCROSE 200 MG: 20 INJECTION, SOLUTION INTRAVENOUS at 08:16

## 2023-08-18 RX ADMIN — ACETAMINOPHEN 975 MG: 325 TABLET, FILM COATED ORAL at 06:01

## 2023-08-18 RX ADMIN — ENOXAPARIN SODIUM 60 MG: 60 INJECTION SUBCUTANEOUS at 08:19

## 2023-08-18 RX ADMIN — LABETALOL HYDROCHLORIDE 200 MG: 200 TABLET, FILM COATED ORAL at 08:18

## 2023-08-18 RX ADMIN — ENOXAPARIN SODIUM 60 MG: 60 INJECTION SUBCUTANEOUS at 20:52

## 2023-08-18 RX ADMIN — ACETAMINOPHEN 975 MG: 325 TABLET, FILM COATED ORAL at 13:17

## 2023-08-18 RX ADMIN — IBUPROFEN 600 MG: 600 TABLET ORAL at 20:52

## 2023-08-18 RX ADMIN — DOCUSATE SODIUM 100 MG: 100 CAPSULE, LIQUID FILLED ORAL at 08:19

## 2023-08-18 RX ADMIN — SODIUM CHLORIDE, SODIUM LACTATE, POTASSIUM CHLORIDE, AND CALCIUM CHLORIDE 1000 ML: .6; .31; .03; .02 INJECTION, SOLUTION INTRAVENOUS at 10:35

## 2023-08-18 NOTE — PLAN OF CARE
Problem: PAIN - ADULT  Goal: Verbalizes/displays adequate comfort level or baseline comfort level  Description: Interventions:  - Encourage patient to monitor pain and request assistance  - Assess pain using appropriate pain scale  - Administer analgesics based on type and severity of pain and evaluate response  - Implement non-pharmacological measures as appropriate and evaluate response  - Consider cultural and social influences on pain and pain management  - Notify physician/advanced practitioner if interventions unsuccessful or patient reports new pain  Outcome: Progressing     Problem: INFECTION - ADULT  Goal: Absence or prevention of progression during hospitalization  Description: INTERVENTIONS:  - Assess and monitor for signs and symptoms of infection  - Monitor lab/diagnostic results  - Monitor all insertion sites, i.e. indwelling lines, tubes, and drains  - Monitor endotracheal if appropriate and nasal secretions for changes in amount and color  - [place] appropriate cooling/warming therapies per order  - Administer medications as ordered  - Instruct and encourage patient and family to use good hand hygiene technique    Outcome: Progressing  Goal: Absence of fever/infection during neutropenic period  Description: INTERVENTIONS:  - Monitor WBC    Outcome: Progressing     Problem: SAFETY ADULT  Goal: Patient will remain free of falls  Description: INTERVENTIONS:  - Educate patient/family on patient safety including physical limitations  - Instruct patient to call for assistance with activity   - Keep Call bell within reach  - Keep bed low and locked with side rails adjusted as appropriate  - Keep care items and personal belongings within reach  - Initiate and maintain comfort rounds  - Offer Toileting in advance of need    Outcome: Progressing     Problem: DISCHARGE PLANNING  Goal: Discharge to home or other facility with appropriate resources  Description: INTERVENTIONS:  - Identify barriers to discharge w/patient and caregiver  - Arrange for needed discharge resources and transportation as appropriate  - Identify discharge learning needs (meds, wound care, etc.)  - Refer to Case Management Department for coordinating discharge planning if the patient needs post-hospital services based on physician/advanced practitioner order or complex needs related to functional status, cognitive ability, or social support system  Outcome: Progressing     Problem: Knowledge Deficit  Goal: Patient/family/caregiver demonstrates understanding of disease process, treatment plan, medications, and discharge instructions  Description: Complete learning assessment and assess knowledge base.   Interventions:  - Provide teaching at level of understanding  - Provide teaching via preferred learning methods  Outcome: Progressing     Problem: POSTPARTUM  Goal: Experiences normal postpartum course  Description: INTERVENTIONS:  - Monitor maternal vital signs  - Assess uterine involution and lochia  Outcome: Progressing  Goal: Appropriate maternal -  bonding  Description: INTERVENTIONS:  - Identify family support  - Assess for appropriate maternal/infant bonding   -Encourage maternal/infant bonding opportunities  - Referral to  or  as needed  Outcome: Progressing  Goal: Establishment of infant feeding pattern  Description: INTERVENTIONS:  - Assess breast/bottle feeding  - Refer to lactation as needed  Outcome: Progressing  Goal: Incision(s), wounds(s) or drain site(s) healing without S/S of infection  Description: INTERVENTIONS  - Assess and document dressing, incision, wound bed, drain sites and surrounding tissue  - Provide patient and family education  - Perform skin care/dressing changes   Outcome: Progressing

## 2023-08-18 NOTE — PROGRESS NOTES
Progress Note - OB/GYN   Kell Persaud 35 y.o. female MRN: [id number]  Unit/Bed#: -01 Encounter: [id number]      Assessment/Plan    Kell Persaud is a 35 y.o. Allie Gilman who is PPD 1 s/p LTCS at 37w3d     * Status post repeat low transverse  section  Assessment & Plan  • Routine postpartum care  • Encourage ambulation  • Encourage familial bonding  • Lactation support as needed  • Pain: Motrin/Tylenol around the clock, oxycodone if needed  • Pre-delivery Hgb 12 --> Post Delivery pending  • Brown catheter: removed, follow up void trial  • Low urine output on , plan for bolus  and will follow UOP  • DVT Ppx: ambulation, SCDs , Lovenox 60 BID    Chronic hypertension affecting pregnancy  Assessment & Plan  Upon admission severe range blood pressures noted , home dose Labetalol 100 mg  Admission cbc, cmp and P:C wnl  : Labetalol increased to 033UK BID  Systolic (27XFV), WLL:569 , Min:114 , KBZ:699    Diastolic (45AJC), HSX:00, Min:56, Max:84      Maternal morbid obesity, antepartum ([address])  Assessment & Plan  BMI 59.03  DVT ppx: SCDs & Lovenox 60 BID        Disposition: Anticipate discharge home postpartum Day #2-3  Barriers to discharge: ongoing couplet care, postsurgical care      Subjective/Objective     Subjective: Postpartum state    Pain: no  Tolerating PO: yes  Voiding: yes  Flatus: no  BM: no  Ambulating: no  Breastfeeding: Bottle feeding  Chest pain: no  Shortness of breath: no  Leg pain: no  Lochia: minimal    Objective:     Vitals:  Vitals:    23 1958 23 2117 23 0021 23 0412   BP: 126/67 114/56 124/60 114/59   BP Location: Left arm  Right arm Right arm   Pulse: 83 86 79 74   Resp:    Temp: (!) 97.4 °F (36.3 °C)  98.3 °F (36.8 °C) 98 °F (36.7 °C)   TempSrc: Temporal  Oral Oral   SpO2: 96%  94% 95%   Weight:       Height:           Physical Exam:   GEN: appears well, alert and oriented x 3, pleasant and cooperative   CV: Regular rate  RESP: non labored breathing  ABDOMEN: soft, no tenderness, no distention, Uterine fundus firm and non-tender, +1 cm above the umbilicus, incision c/d/i  EXTREMITIES: non-tender  NEURO Alert and oriented to person, place, and time.        Lab Results   Component Value Date    WBC 11.51 (H) 08/18/2023    HGB 9.6 (L) 08/18/2023    HCT 29.2 (L) 08/18/2023    MCV 92 08/18/2023     08/18/2023         Olga Gómez MD  Obstetrics & Gynecology  08/18/23

## 2023-08-18 NOTE — PLAN OF CARE
Problem: PAIN - ADULT  Goal: Verbalizes/displays adequate comfort level or baseline comfort level  Description: Interventions:  - Encourage patient to monitor pain and request assistance  - Assess pain using appropriate pain scale  - Administer analgesics based on type and severity of pain and evaluate response  - Implement non-pharmacological measures as appropriate and evaluate response  - Consider cultural and social influences on pain and pain management  - Notify physician/advanced practitioner if interventions unsuccessful or patient reports new pain  Outcome: Progressing     Problem: INFECTION - ADULT  Goal: Absence or prevention of progression during hospitalization  Description: INTERVENTIONS:  - Assess and monitor for signs and symptoms of infection  - Monitor lab/diagnostic results  - Monitor all insertion sites, i.e. indwelling lines, tubes, and drains  - Monitor endotracheal if appropriate and nasal secretions for changes in amount and color  - [place] appropriate cooling/warming therapies per order  - Administer medications as ordered  - Instruct and encourage patient and family to use good hand hygiene technique    Outcome: Progressing  Goal: Absence of fever/infection during neutropenic period  Description: INTERVENTIONS:  - Monitor WBC    Outcome: Progressing     Problem: SAFETY ADULT  Goal: Patient will remain free of falls  Description: INTERVENTIONS:  - Educate patient/family on patient safety including physical limitations  - Instruct patient to call for assistance with activity   - Keep Call bell within reach  - Keep bed low and locked with side rails adjusted as appropriate  - Keep care items and personal belongings within reach  - Initiate and maintain comfort rounds  - Offer Toileting in advance of need    Outcome: Progressing     Problem: DISCHARGE PLANNING  Goal: Discharge to home or other facility with appropriate resources  Description: INTERVENTIONS:  - Identify barriers to discharge w/patient and caregiver  - Arrange for needed discharge resources and transportation as appropriate  - Identify discharge learning needs (meds, wound care, etc.)  - Refer to Case Management Department for coordinating discharge planning if the patient needs post-hospital services based on physician/advanced practitioner order or complex needs related to functional status, cognitive ability, or social support system  Outcome: Progressing     Problem: Knowledge Deficit  Goal: Patient/family/caregiver demonstrates understanding of disease process, treatment plan, medications, and discharge instructions  Description: Complete learning assessment and assess knowledge base.   Interventions:  - Provide teaching at level of understanding  - Provide teaching via preferred learning methods  Outcome: Progressing     Problem: POSTPARTUM  Goal: Experiences normal postpartum course  Description: INTERVENTIONS:  - Monitor maternal vital signs  - Assess uterine involution and lochia  Outcome: Progressing  Goal: Appropriate maternal -  bonding  Description: INTERVENTIONS:  - Identify family support  - Assess for appropriate maternal/infant bonding   -Encourage maternal/infant bonding opportunities  - Referral to  or  as needed  Outcome: Progressing  Goal: Establishment of infant feeding pattern  Description: INTERVENTIONS:  - Assess breast/bottle feeding  - Refer to lactation as needed  Outcome: Progressing  Goal: Incision(s), wounds(s) or drain site(s) healing without S/S of infection  Description: INTERVENTIONS  - Assess and document dressing, incision, wound bed, drain sites and surrounding tissue  - Provide patient and family education  Outcome: Progressing

## 2023-08-19 VITALS
HEART RATE: 79 BPM | TEMPERATURE: 97.6 F | BODY MASS INDEX: 55.32 KG/M2 | SYSTOLIC BLOOD PRESSURE: 135 MMHG | WEIGHT: 293 LBS | DIASTOLIC BLOOD PRESSURE: 82 MMHG | HEIGHT: 61 IN | RESPIRATION RATE: 18 BRPM | OXYGEN SATURATION: 97 %

## 2023-08-19 PROCEDURE — 99024 POSTOP FOLLOW-UP VISIT: CPT | Performed by: OBSTETRICS & GYNECOLOGY

## 2023-08-19 RX ORDER — OXYCODONE HYDROCHLORIDE 5 MG/1
5 TABLET ORAL EVERY 4 HOURS PRN
Qty: 5 TABLET | Refills: 0 | Status: CANCELLED | OUTPATIENT
Start: 2023-08-19 | End: 2023-08-29

## 2023-08-19 RX ORDER — DOCUSATE SODIUM 100 MG/1
100 CAPSULE, LIQUID FILLED ORAL 2 TIMES DAILY PRN
Refills: 0
Start: 2023-08-19 | End: 2023-08-24

## 2023-08-19 RX ORDER — IBUPROFEN 600 MG/1
600 TABLET ORAL EVERY 6 HOURS PRN
Qty: 60 TABLET | Refills: 0 | Status: SHIPPED | OUTPATIENT
Start: 2023-08-19 | End: 2023-08-24

## 2023-08-19 RX ORDER — LABETALOL 200 MG/1
200 TABLET, FILM COATED ORAL EVERY 12 HOURS SCHEDULED
Qty: 60 TABLET | Refills: 2 | Status: SHIPPED | OUTPATIENT
Start: 2023-08-19 | End: 2023-08-24 | Stop reason: SDUPTHER

## 2023-08-19 RX ORDER — ACETAMINOPHEN 325 MG/1
650 TABLET ORAL EVERY 6 HOURS PRN
Refills: 0
Start: 2023-08-19 | End: 2023-08-24

## 2023-08-19 RX ADMIN — ACETAMINOPHEN 975 MG: 325 TABLET, FILM COATED ORAL at 05:39

## 2023-08-19 RX ADMIN — LABETALOL HYDROCHLORIDE 200 MG: 200 TABLET, FILM COATED ORAL at 08:55

## 2023-08-19 RX ADMIN — IBUPROFEN 600 MG: 600 TABLET ORAL at 03:34

## 2023-08-19 RX ADMIN — IBUPROFEN 600 MG: 600 TABLET ORAL at 08:55

## 2023-08-19 NOTE — PLAN OF CARE
Problem: PAIN - ADULT  Goal: Verbalizes/displays adequate comfort level or baseline comfort level  Description: Interventions:  - Encourage patient to monitor pain and request assistance  - Assess pain using appropriate pain scale  - Administer analgesics based on type and severity of pain and evaluate response  - Implement non-pharmacological measures as appropriate and evaluate response  - Consider cultural and social influences on pain and pain management  - Notify physician/advanced practitioner if interventions unsuccessful or patient reports new pain  Outcome: Progressing     Problem: INFECTION - ADULT  Goal: Absence or prevention of progression during hospitalization  Description: INTERVENTIONS:  - Assess and monitor for signs and symptoms of infection  - Monitor lab/diagnostic results  - Monitor all insertion sites, i.e. indwelling lines, tubes, and drains  - Monitor endotracheal if appropriate and nasal secretions for changes in amount and color  - [place] appropriate cooling/warming therapies per order  - Administer medications as ordered  - Instruct and encourage patient and family to use good hand hygiene technique    Outcome: Progressing  Goal: Absence of fever/infection during neutropenic period  Description: INTERVENTIONS:  - Monitor WBC    Outcome: Progressing     Problem: SAFETY ADULT  Goal: Patient will remain free of falls  Description: INTERVENTIONS:  - Educate patient/family on patient safety including physical limitations  - Instruct patient to call for assistance with activity   - Keep Call bell within reach  - Keep bed low and locked with side rails adjusted as appropriate  - Keep care items and personal belongings within reach  - Initiate and maintain comfort rounds  - Offer Toileting in advance of need    Outcome: Progressing     Problem: DISCHARGE PLANNING  Goal: Discharge to home or other facility with appropriate resources  Description: INTERVENTIONS:  - Identify barriers to discharge w/patient and caregiver  - Arrange for needed discharge resources and transportation as appropriate  - Identify discharge learning needs (meds, wound care, etc.)  - Refer to Case Management Department for coordinating discharge planning if the patient needs post-hospital services based on physician/advanced practitioner order or complex needs related to functional status, cognitive ability, or social support system  Outcome: Progressing     Problem: Knowledge Deficit  Goal: Patient/family/caregiver demonstrates understanding of disease process, treatment plan, medications, and discharge instructions  Description: Complete learning assessment and assess knowledge base. Interventions:  - Provide teaching at level of understanding  - Provide teaching via preferred learning methods  Outcome: Progressing     Problem: POSTPARTUM  Goal: Experiences normal postpartum course  Description: INTERVENTIONS:  - Monitor maternal vital signs  - Assess uterine involution and lochia  Outcome: Progressing  Goal: Appropriate maternal -  bonding  Description: INTERVENTIONS:  - Identify family support  - Assess for appropriate maternal/infant bonding   -Encourage maternal/infant bonding opportunities  - Referral to  or  as needed  Outcome: Progressing  Goal: Establishment of infant feeding pattern  Description: INTERVENTIONS:  - Assess breast/bottle feeding  - Refer to lactation as needed  Outcome: Progressing  Goal: Incision(s), wounds(s) or drain site(s) healing without S/S of infection  Description: INTERVENTIONS  - Assess and document dressing, incision, wound bed, drain sites and surrounding tissue  - Provide patient and family education.   Outcome: Progressing

## 2023-08-19 NOTE — PROGRESS NOTES
Progress Note - OB/GYN   Korey Huynh 35 y.o. female MRN: [id number]  Unit/Bed#: -01 Encounter: [id number]      Assessment/Plan    Korey Huynh is a 35 y.o. Lashon Riff who is PPD 2 s/p LTCS at 37w3d     Maternal morbid obesity, antepartum ([address])  Assessment & Plan  BMI 59.03  DVT ppx: SCDs & Lovenox 60 BID     Chronic hypertension affecting pregnancy  Assessment & Plan  Upon admission severe range blood pressures noted , home dose Labetalol 100 mg  Admission cbc, cmp and P:C wnl  : Labetalol increased to 186XE BID  Systolic (85JWM), :535 , Min:126 , NWX:070    Diastolic (31PWO), UQY:05, Min:61, Max:82      * Status post repeat low transverse  section  Assessment & Plan  • Routine postpartum care  • Encourage ambulation  • Encourage familial bonding  • Lactation support as needed  • Pain: Motrin/Tylenol around the clock, oxycodone if needed  • Pre-delivery Hgb 12 --> Post Delivery 9.6, venofer given  • Brown catheter: removed, voiding  • DVT Ppx: ambulation, SCDs , Lovenox 60 BID       Disposition: Anticipate discharge home postpartum Day #2-3  Barriers to discharge: ongoing couplet care, postsurgical care      Subjective/Objective     Subjective: Postpartum state    Pain: no  Tolerating PO: yes  Voiding: yes  Flatus: no  BM: no  Ambulating: no  Breastfeeding: Bottle feeding  Chest pain: no  Shortness of breath: no  Leg pain: no  Lochia: minimal    Objective:     Vitals:  Vitals:    23 2045 23 2052 23 0200 23 0427   BP: 126/61 126/61 137/70 146/82   BP Location: Right arm  Right arm Right arm   Pulse: 101 101 89 76   Resp:    Temp: 97.8 °F (36.6 °C)  97.8 °F (36.6 °C) 97.7 °F (36.5 °C)   TempSrc: Oral  Oral Oral   SpO2:   98% 99%   Weight:       Height:           Physical Exam:   GEN: appears well, alert and oriented x 3, pleasant and cooperative   CV: Regular rate  RESP: non labored breathing  ABDOMEN: soft, no tenderness, no distention, Uterine fundus firm and non-tender, at umbilicus, incision c/d/i  EXTREMITIES: non-tender  NEURO Alert and oriented to person, place, and time.        Lab Results   Component Value Date    WBC 11.51 (H) 08/18/2023    HGB 9.6 (L) 08/18/2023    HCT 29.2 (L) 08/18/2023    MCV 92 08/18/2023     08/18/2023         Jazmin Levine MD  Obstetrics & Gynecology  08/19/23

## 2023-08-19 NOTE — PLAN OF CARE
Problem: PAIN - ADULT  Goal: Verbalizes/displays adequate comfort level or baseline comfort level  Description: Interventions:  - Encourage patient to monitor pain and request assistance  - Assess pain using appropriate pain scale  - Administer analgesics based on type and severity of pain and evaluate response  - Implement non-pharmacological measures as appropriate and evaluate response  - Consider cultural and social influences on pain and pain management  - Notify physician/advanced practitioner if interventions unsuccessful or patient reports new pain  Outcome: Progressing     Problem: INFECTION - ADULT  Goal: Absence or prevention of progression during hospitalization  Description: INTERVENTIONS:  - Assess and monitor for signs and symptoms of infection  - Monitor lab/diagnostic results  - Monitor all insertion sites, i.e. indwelling lines, tubes, and drains  - Monitor endotracheal if appropriate and nasal secretions for changes in amount and color  - Mellen appropriate cooling/warming therapies per order  - Administer medications as ordered  - Instruct and encourage patient and family to use good hand hygiene technique    Outcome: Progressing  Goal: Absence of fever/infection during neutropenic period  Description: INTERVENTIONS:  - Monitor WBC    Outcome: Progressing     Problem: SAFETY ADULT  Goal: Patient will remain free of falls  Description: INTERVENTIONS:  - Educate patient/family on patient safety including physical limitations  - Instruct patient to call for assistance with activity   - Keep Call bell within reach  - Keep bed low and locked with side rails adjusted as appropriate  - Keep care items and personal belongings within reach  - Initiate and maintain comfort rounds  - Offer Toileting in advance of need    Outcome: Progressing     Problem: DISCHARGE PLANNING  Goal: Discharge to home or other facility with appropriate resources  Description: INTERVENTIONS:  - Identify barriers to discharge w/patient and caregiver  - Arrange for needed discharge resources and transportation as appropriate  - Identify discharge learning needs (meds, wound care, etc.)  - Refer to Case Management Department for coordinating discharge planning if the patient needs post-hospital services based on physician/advanced practitioner order or complex needs related to functional status, cognitive ability, or social support system  Outcome: Progressing     Problem: Knowledge Deficit  Goal: Patient/family/caregiver demonstrates understanding of disease process, treatment plan, medications, and discharge instructions  Description: Complete learning assessment and assess knowledge base.   Interventions:  - Provide teaching at level of understanding  - Provide teaching via preferred learning methods  Outcome: Progressing     Problem: POSTPARTUM  Goal: Experiences normal postpartum course  Description: INTERVENTIONS:  - Monitor maternal vital signs  - Assess uterine involution and lochia  Outcome: Progressing  Goal: Appropriate maternal -  bonding  Description: INTERVENTIONS:  - Identify family support  - Assess for appropriate maternal/infant bonding   -Encourage maternal/infant bonding opportunities  - Referral to  or  as needed  Outcome: Progressing  Goal: Establishment of infant feeding pattern  Description: INTERVENTIONS:  - Assess breast/bottle feeding  - Refer to lactation as needed  Outcome: Progressing  Goal: Incision(s), wounds(s) or drain site(s) healing without S/S of infection  Description: INTERVENTIONS  - Assess and document dressing, incision, wound bed, drain sites and surrounding tissue  - Provide patient and family education  Outcome: Progressing

## 2023-08-19 NOTE — PLAN OF CARE
Problem: PAIN - ADULT  Goal: Verbalizes/displays adequate comfort level or baseline comfort level  Description: Interventions:  - Encourage patient to monitor pain and request assistance  - Assess pain using appropriate pain scale  - Administer analgesics based on type and severity of pain and evaluate response  - Implement non-pharmacological measures as appropriate and evaluate response  - Consider cultural and social influences on pain and pain management  - Notify physician/advanced practitioner if interventions unsuccessful or patient reports new pain  8/19/2023 1435 by Melchor Fernandez RN  Outcome: Completed  8/19/2023 1042 by Melchor Fernandez RN  Outcome: Progressing     Problem: INFECTION - ADULT  Goal: Absence or prevention of progression during hospitalization  Description: INTERVENTIONS:  - Assess and monitor for signs and symptoms of infection  - Monitor lab/diagnostic results  - Monitor all insertion sites, i.e. indwelling lines, tubes, and drains  - Monitor endotracheal if appropriate and nasal secretions for changes in amount and color  - [place] appropriate cooling/warming therapies per order  - Administer medications as ordered  - Instruct and encourage patient and family to use good hand hygiene technique    8/19/2023 1435 by Melchor Fernandez RN  Outcome: Completed  8/19/2023 1042 by Melchor Fernandez RN  Outcome: Progressing  Goal: Absence of fever/infection during neutropenic period  Description: INTERVENTIONS:  - Monitor WBC    8/19/2023 1435 by Melchor Fernandez RN  Outcome: Completed  8/19/2023 1042 by Melchor Fernandez RN  Outcome: Progressing     Problem: SAFETY ADULT  Goal: Patient will remain free of falls  Description: INTERVENTIONS:  - Educate patient/family on patient safety including physical limitations  - Instruct patient to call for assistance with activity   - Keep Call bell within reach  - Keep bed low and locked with side rails adjusted as appropriate  - Keep care items and personal belongings within reach  - Initiate and maintain comfort rounds  - Offer Toileting in advance of need    2023 143 by Frankie Amaya RN  Outcome: Completed  2023 104 by Frankie Amaya, RN  Outcome: Progressing     Problem: DISCHARGE PLANNING  Goal: Discharge to home or other facility with appropriate resources  Description: INTERVENTIONS:  - Identify barriers to discharge w/patient and caregiver  - Arrange for needed discharge resources and transportation as appropriate  - Identify discharge learning needs (meds, wound care, etc.)  - Refer to Case Management Department for coordinating discharge planning if the patient needs post-hospital services based on physician/advanced practitioner order or complex needs related to functional status, cognitive ability, or social support system  2023 143 by Frankie Amaya RN  Outcome: Completed  2023 104 by Frankie Amaya, RN  Outcome: Progressing     Problem: Knowledge Deficit  Goal: Patient/family/caregiver demonstrates understanding of disease process, treatment plan, medications, and discharge instructions  Description: Complete learning assessment and assess knowledge base.   Interventions:  - Provide teaching at level of understanding  - Provide teaching via preferred learning methods  2023 1435 by Frankie Amaya, RN  Outcome: Completed  2023 1042 by Frankie Amaya, RN  Outcome: Progressing     Problem: POSTPARTUM  Goal: Experiences normal postpartum course  Description: INTERVENTIONS:  - Monitor maternal vital signs  - Assess uterine involution and lochia  2023 1435 by Frankie Amaya, RN  Outcome: Completed  2023 1042 by Frankie Amaya, RN  Outcome: Progressing  Goal: Appropriate maternal -  bonding  Description: INTERVENTIONS:  - Identify family support  - Assess for appropriate maternal/infant bonding   -Encourage maternal/infant bonding opportunities  - Referral to  or  as needed  8/19/2023 1435 by Arian Garcia RN  Outcome: Completed  8/19/2023 1042 by Arian Garcia RN  Outcome: Progressing  Goal: Establishment of infant feeding pattern  Description: INTERVENTIONS:  - Assess breast/bottle feeding  - Refer to lactation as needed  8/19/2023 1435 by Arian Garcia RN  Outcome: Completed  8/19/2023 1042 by Arian Garcia RN  Outcome: Progressing  Goal: Incision(s), wounds(s) or drain site(s) healing without S/S of infection  Description: INTERVENTIONS  - Assess and document dressing, incision, wound bed, drain sites and surrounding tissue  - Provide patient and family education  8/19/2023 1435 by Arian Garcia RN  Outcome: Completed  8/19/2023 1042 by Arian Garcia RN  Outcome: Progressing

## 2023-08-21 PROCEDURE — 88307 TISSUE EXAM BY PATHOLOGIST: CPT | Performed by: PATHOLOGY

## 2023-08-21 NOTE — UTILIZATION REVIEW
NOTIFICATION OF INPATIENT ADMISSION   MATERNITY/DELIVERY AUTHORIZATION REQUEST   SERVICING FACILITY:   [address] [place] [address] - L&D, , NICU  [address] [facility]. [address]  Tax ID: [id number]  NPI: [id number]   ATTENDING PROVIDER:  Attending Name and NPI#: Cuauhtemoc Rose, [id number] [place] [address] [[id number]]  Address: [address] [place] [address], [address] [place] [address]  Phone: [phone]   ADMISSION INFORMATION:  Place of Service[facility]patient [facility] [address] [facility]   Place of Service Code: 21  Inpatient Admission Date/Time: 23 10:50 AM  Discharge Date/Time: 2023  2:00 PM  Admitting Diagnosis Code/Description:  Chronic hypertension during pregnancy, antepartum [O10.919]  Previous  section complicating pregnancy [U43.052]  Encounter for  delivery without indication [O82]     Mother: Janelle Hudson 1990 Estimated Date of Delivery: 23  Delivering clinician: Cuauhtemoc Rose    OB History        2    Para   2    Term   2       0    AB   0    Living   2       SAB   0    IAB   0    Ectopic   0    Multiple   0    Live Births   2           Obstetric Comments   1 C/S - IOL for hypertension, failed induction x2            Name & MRN:   Information for the patient's :  Chu Rojo [[id number]]     [place] Delivery Information:  Sex: male  Delivered 2023 1:34 PM by , Low Transverse; Gestational Age: 44w1d    [place] Measurements:  Weight: 9 lb 1.9 oz (4135 g); Height: 20.5"    APGAR 1 minute 5 minutes 10 minutes   Totals: 5 9      [place] Birth Information: 35 y.o. female MRN: [id number] Unit/Bed#: -01   Birthweight: No birth weight on file. Gestational Age: <None> Delivery Type:    APGARS Totals:        UTILIZATION REVIEW CONTACT:  Shanda Guillen Utilization   Network Utilization Review Department  Phone: [phone]  Fax [phone]  Email: [email or web address]  Contact for approvals/pending authorizations, clinical reviews, and discharge. PHYSICIAN ADVISORY SERVICES:  Medical Necessity Denial & Gdjy-tz-Lszc Review  Phone: [phone]  Fax: [phone]  Email: [email or web address]

## 2023-08-24 ENCOUNTER — OFFICE VISIT (OUTPATIENT)
Dept: OBGYN CLINIC | Facility: CLINIC | Age: 33
End: 2023-08-24

## 2023-08-24 ENCOUNTER — TELEPHONE (OUTPATIENT)
Dept: OBGYN CLINIC | Facility: CLINIC | Age: 33
End: 2023-08-24

## 2023-08-24 DIAGNOSIS — O10.919 CHRONIC HYPERTENSION AFFECTING PREGNANCY: ICD-10-CM

## 2023-08-24 DIAGNOSIS — O09.293 HX OF PREECLAMPSIA, PRIOR PREGNANCY, CURRENTLY PREGNANT, THIRD TRIMESTER: Primary | ICD-10-CM

## 2023-08-24 PROCEDURE — 99024 POSTOP FOLLOW-UP VISIT: CPT

## 2023-08-24 RX ORDER — LABETALOL 200 MG/1
400 TABLET, FILM COATED ORAL EVERY 12 HOURS SCHEDULED
Qty: 120 TABLET | Refills: 2 | Status: SHIPPED | OUTPATIENT
Start: 2023-08-24 | End: 2023-11-22

## 2023-08-24 RX ORDER — FUROSEMIDE 20 MG/1
20 TABLET ORAL DAILY
Qty: 5 TABLET | Refills: 0 | Status: SHIPPED | OUTPATIENT
Start: 2023-08-24 | End: 2023-08-29

## 2023-08-24 NOTE — TELEPHONE ENCOUNTER
Pt is one week post op c/s. She has a dull headache that started yesterday. She is currently on 200mg Labetalol. Do you have any other recommendations? She has an appt tomorrow.

## 2023-08-24 NOTE — PROGRESS NOTES
Pt called earlier today with a headache and high BP readings at home. Pt came in today for an appt and her BP was 158/102. Per Dr. Jami Luis, Pt is to start 400mg of Labetalol and also add Lasix to help with her BP. Pt is aware and will keep her appt tomorrow for another BP check.

## 2023-08-25 ENCOUNTER — POSTPARTUM VISIT (OUTPATIENT)
Dept: OBGYN CLINIC | Facility: CLINIC | Age: 33
End: 2023-08-25

## 2023-08-25 VITALS
HEIGHT: 61 IN | SYSTOLIC BLOOD PRESSURE: 126 MMHG | DIASTOLIC BLOOD PRESSURE: 88 MMHG | WEIGHT: 289 LBS | BODY MASS INDEX: 54.56 KG/M2

## 2023-08-25 DIAGNOSIS — I10 CHRONIC HYPERTENSION: ICD-10-CM

## 2023-08-25 PROCEDURE — 99024 POSTOP FOLLOW-UP VISIT: CPT | Performed by: PHYSICIAN ASSISTANT

## 2023-08-30 ENCOUNTER — CLINICAL SUPPORT (OUTPATIENT)
Dept: OBGYN CLINIC | Facility: CLINIC | Age: 33
End: 2023-08-30
Payer: COMMERCIAL

## 2023-08-30 VITALS — DIASTOLIC BLOOD PRESSURE: 88 MMHG | SYSTOLIC BLOOD PRESSURE: 118 MMHG

## 2023-08-30 DIAGNOSIS — Z01.30 BLOOD PRESSURE CHECK: Primary | ICD-10-CM

## 2023-08-30 PROCEDURE — 99211 OFF/OP EST MAY X REQ PHY/QHP: CPT

## 2023-08-30 NOTE — PROGRESS NOTES
Patient here for BP check  Currently on 400 mg of Labetalol BID, she completed a 5 day course of Lasix  She denies any H/A, visual changes or unusual swelling  BP today 118/88  She checks her BP's at home and have been 120's over 80's  Discussed with Caryn Kwon.  Patient to stay at current dose  Return in 1 week for BP check

## 2023-09-05 ENCOUNTER — TELEPHONE (OUTPATIENT)
Dept: OBGYN CLINIC | Facility: CLINIC | Age: 33
End: 2023-09-05

## 2023-09-05 DIAGNOSIS — O10.919 CHRONIC HYPERTENSION AFFECTING PREGNANCY: ICD-10-CM

## 2023-09-05 RX ORDER — LABETALOL 200 MG/1
400 TABLET, FILM COATED ORAL EVERY 12 HOURS SCHEDULED
Qty: 120 TABLET | Refills: 0 | Status: SHIPPED | OUTPATIENT
Start: 2023-09-05 | End: 2023-12-04

## 2023-09-05 NOTE — TELEPHONE ENCOUNTER
PP patient calling. She is currently on Labetalol 400 in the AM and PM. She needs refills sent to the pharmacy.  She will need it for her AM dose tomorrow     She has an apt for a BP check tomorrow with me

## 2023-09-06 ENCOUNTER — CLINICAL SUPPORT (OUTPATIENT)
Dept: OBGYN CLINIC | Facility: CLINIC | Age: 33
End: 2023-09-06

## 2023-09-06 VITALS — SYSTOLIC BLOOD PRESSURE: 116 MMHG | DIASTOLIC BLOOD PRESSURE: 74 MMHG

## 2023-09-06 DIAGNOSIS — Z01.30 BLOOD PRESSURE CHECK: Primary | ICD-10-CM

## 2023-09-06 NOTE — PROGRESS NOTES
Pt. Presents for BP check post partum. Normotensive in office today. Per provider pt. To continue meds as prescribed. Report symptoms of low blood pressure.

## 2023-09-26 ENCOUNTER — POSTPARTUM VISIT (OUTPATIENT)
Dept: OBGYN CLINIC | Facility: CLINIC | Age: 33
End: 2023-09-26

## 2023-09-26 VITALS
HEIGHT: 61 IN | WEIGHT: 286 LBS | DIASTOLIC BLOOD PRESSURE: 76 MMHG | BODY MASS INDEX: 54 KG/M2 | SYSTOLIC BLOOD PRESSURE: 118 MMHG

## 2023-09-26 DIAGNOSIS — Z30.41 ENCOUNTER FOR SURVEILLANCE OF CONTRACEPTIVE PILLS: ICD-10-CM

## 2023-09-26 DIAGNOSIS — Z98.891 STATUS POST REPEAT LOW TRANSVERSE CESAREAN SECTION: ICD-10-CM

## 2023-09-26 PROCEDURE — 99024 POSTOP FOLLOW-UP VISIT: CPT | Performed by: OBSTETRICS & GYNECOLOGY

## 2023-09-26 RX ORDER — LEVONORGESTREL AND ETHINYL ESTRADIOL 0.1-0.02MG
1 KIT ORAL DAILY
Qty: 84 TABLET | Refills: 3 | Status: SHIPPED | OUTPATIENT
Start: 2023-09-26 | End: 2024-08-27

## 2023-09-26 NOTE — PROGRESS NOTES
Assessment/Plan     Normal postpartum exam.     1. Contraception: OCP (estrogen/progesterone)  2. Annual exam due in January. 3. Lactation consult, [address] [address] information discussed. 4. Increase activity as tolerated, may resume all normal activity. 5. Anticipated return to work: 6 - 12 weeks post partum. 6 CHTN - decrease labetalol to 200mg q12h, will come for a BP check with the nurse next week and will attempt to continue to decrease labetalol until discontinued      Subjective   Chief Complaint   Patient presents with   • Postpartum Care     Patient is here today for her 6 week postpartum follow up, CS , baby boy(Cecil), 9lb 1.9oz. Patient states she is doing well, she would like to discuss [facility] today. Jose Luis Kaur is a 35 y.o.  female who presents for a postpartum visit. Delivery Method: , Low Transverse    Delivery Date and Time: 2023  1:34 PM   Delivery Attending: Yun Martins   Gestational Age at delivery: 44w1d   Route of Delivery: , Low Transverse   Reason for  delivery:    Repeat  Admitting Diagnoses:   Patient Active Problem List   Diagnosis   • History of  section   • Obesity affecting pregnancy   • Hx of preeclampsia, prior pregnancy, currently pregnant, third trimester   • Status post repeat low transverse  section   • Chronic hypertension affecting pregnancy   • Maternal morbid obesity, antepartum ([address])   • Fetal macrosomia during pregnancy       Gestational Diabetes: no  Pregnancy Complications: CHTN    Anesthesia: Spinal ,     Delivery: , Low Transverse  at 2023  1:34 PM     Baby's Weight: 4135 g (9 lb 1.9 oz) ; 145.86     Apgar scores: 5  and 9  at 1 and 5 minutes, respectively    Breast or formula: Bottle formula          Bleeding no bleeding. Bowel function: normal. Bladder function: normal.     Patient has not been sexually active. Desired contraception method is OCP (estrogen/progesterone). Postpartum depression screening: negative. EPDS : 1    Last Pap : 2022 ; no abnormalities      The following portions of the patient's history were reviewed and updated as appropriate: allergies, current medications, past family history, past medical history, past social history, past surgical history and problem list.      Current Outpatient Medications:   •  levonorgestrel-ethinyl estradiol (Aviane) 0.1-20 MG-MCG per tablet, Take 1 tablet by mouth daily, Disp: 84 tablet, Rfl: 3  •  furosemide (Lasix) 20 mg tablet, Take 1 tablet (20 mg total) by mouth daily for 5 days, Disp: 5 tablet, Rfl: 0  •  labetalol (NORMODYNE) 200 mg tablet, Take 2 tablets (400 mg total) by mouth every 12 (twelve) hours, Disp: 120 tablet, Rfl: 0  •  Prenatal Vit-Fe Fumarate-FA (PRENATAL VITAMIN PO), Take by mouth, Disp: , Rfl:     Allergies   Allergen Reactions   • Shellfish-Derived Products - Food Allergy Anaphylaxis     Has epi pen , numbness mouth and tongue   • Other Eye Swelling     Seasonal        Review of Systems  Review of Systems   Constitutional: Positive for fatigue. HENT: Negative. Respiratory: Negative. Cardiovascular: Negative. Gastrointestinal: Negative. Genitourinary: Negative. Neurological: Negative. Psychiatric/Behavioral: Negative. Objective      /76 (BP Location: Right arm, Patient Position: Sitting, Cuff Size: Large)   Ht 5' 1" (1.549 m)   Wt 130 kg (286 lb)   LMP 09/21/2023 (Exact Date)   Breastfeeding No   BMI 54.04 kg/m²     Physical Exam  Constitutional:       Appearance: Normal appearance. HENT:      Head: Normocephalic and atraumatic. Eyes:      Extraocular Movements: Extraocular movements intact. Conjunctiva/sclera: Conjunctivae normal.      Pupils: Pupils are equal, round, and reactive to light. Pulmonary:      Effort: Pulmonary effort is normal.   Neurological:      Mental Status: She is alert. Vitals and nursing note reviewed.          Incision: clean, dry, and intact

## 2023-10-02 DIAGNOSIS — O10.919 CHRONIC HYPERTENSION AFFECTING PREGNANCY: ICD-10-CM

## 2023-10-02 RX ORDER — LABETALOL 200 MG/1
400 TABLET, FILM COATED ORAL EVERY 12 HOURS SCHEDULED
Qty: 120 TABLET | Refills: 0 | Status: SHIPPED | OUTPATIENT
Start: 2023-10-02 | End: 2023-12-31

## 2023-10-05 ENCOUNTER — CLINICAL SUPPORT (OUTPATIENT)
Dept: OBGYN CLINIC | Facility: CLINIC | Age: 33
End: 2023-10-05

## 2023-10-05 VITALS — DIASTOLIC BLOOD PRESSURE: 74 MMHG | HEART RATE: 94 BPM | SYSTOLIC BLOOD PRESSURE: 114 MMHG

## 2023-10-05 DIAGNOSIS — Z01.30 BLOOD PRESSURE CHECK: Primary | ICD-10-CM

## 2023-10-05 NOTE — PROGRESS NOTES
Pt. Presenting for BP f/u after tapering labetalol to 200 mg BID. Pt. States she has been feeling well. Denies s/s of hypotension. Reviewed BP with provider today. Per Physician pt. Can taper labetalol to 100 mg BID. F/u next week for BP check.

## 2023-10-12 ENCOUNTER — CLINICAL SUPPORT (OUTPATIENT)
Dept: OBGYN CLINIC | Facility: CLINIC | Age: 33
End: 2023-10-12

## 2023-10-12 VITALS — DIASTOLIC BLOOD PRESSURE: 86 MMHG | SYSTOLIC BLOOD PRESSURE: 120 MMHG

## 2023-10-12 DIAGNOSIS — Z01.30 BLOOD PRESSURE CHECK: Primary | ICD-10-CM

## 2023-10-12 NOTE — PROGRESS NOTES
Patient here for BP check  She is on 100 mg of Labetalol BID  BP today 120/86, patient denies any symptoms  Discussed with Dr. Maulik Cardoza, patient to d/c medication  She is to follow up in January for her yearly visit.  If she notices her BP becoming elevated prior to that she is to call us

## 2023-10-20 DIAGNOSIS — O10.919 CHRONIC HYPERTENSION AFFECTING PREGNANCY: ICD-10-CM

## 2023-10-20 RX ORDER — LABETALOL 200 MG/1
400 TABLET, FILM COATED ORAL EVERY 12 HOURS SCHEDULED
Qty: 120 TABLET | Refills: 0 | Status: CANCELLED | OUTPATIENT
Start: 2023-10-20 | End: 2024-01-18

## 2023-10-20 NOTE — TELEPHONE ENCOUNTER
Kristen Singh is a 80 year old female patient.  Cc ckd 4 - 5 / arf  Patient Active Problem List   Diagnosis   • Chronic kidney disease, stage III (moderate)   • Benign hypertension with CKD (chronic kidney disease) stage III   • Cerebral infarction due to occlusion or stenosis of right middle cerebral artery   • Arthritis of right hip   • Preoperative examination   • Long-term (current) use of anticoagulants   • Status post total replacement of right hip     Past Medical History:   Diagnosis Date   • Anemia     secondary to CKD    • Arthritis    • Chronic kidney disease    • Essential (primary) hypertension    • Gastroesophageal reflux disease    • High cholesterol    • Pneumonia    • Thyroid condition     hyperparathyroidism     • Thyroid nodule    • Urinary tract infection      Current Facility-Administered Medications   Medication Dose Route Frequency Provider Last Rate Last Dose   • amLODIPine (NORVASC) tablet 5 mg  5 mg Oral Daily Jaiden Quiñones MD   5 mg at 08/02/17 1158   • ondansetron (ZOFRAN) injection 4 mg  4 mg Intravenous BID PRN Pascale Griffith MD   4 mg at 08/01/17 0829   • hydrALAZINE (APRESOLINE) tablet 10 mg  10 mg Oral TID Jaiden Quiñones MD   10 mg at 08/02/17 0813   • hydrALAZINE (APRESOLINE) injection 10 mg  10 mg Intravenous Q4H PRN Jaiden Quiñones MD       • sodium chloride 0.9% infusion   Intravenous Continuous Jaiden Quiñones MD   Stopped at 08/02/17 0700   • allopurinol (ZYLOPRIM) tablet 100 mg  100 mg Oral Daily Pascale Griffith MD   100 mg at 08/02/17 0813   • bumetanide (BUMEX) tablet 1 mg  1 mg Oral BID Pascale Griffith MD   1 mg at 08/02/17 0813   • calcitRIOL (ROCALTROL) capsule 0.25 mcg  0.25 mcg Oral Once per day on Mon Pascale Griffith MD   0.25 mcg at 08/01/17 0002   • ferrous sulfate tablet 325 mg  325 mg Oral Daily with breakfast Pascale Griffith MD   325 mg at 08/02/17 0813   • acetaminophen (TYLENOL) tablet 1,000 mg  1,000 mg Oral Q6H PRN Pascale Griffith MD    Patient is requesting a refill on her Labetalol be sent to the [facility] pharmacy in [place] ([place] on [address]. She was last seen on 10/12/23 for a BP check with the nurses and has a yearly scheduled on 1/30/24. Thank you.     • clopidogrel (PLAVIX) tablet 75 mg  75 mg Oral Daily Pascale Griffith MD   75 mg at 08/02/17 0813   • cyanocobalamin (Vitamin B-12) tablet 1,000 mcg  1,000 mcg Oral Daily Pascale Griffith MD   1,000 mcg at 08/02/17 0813   • pantoprazole (PROTONIX) EC tablet 40 mg  40 mg Oral QAM AC Pascale Griffith MD   40 mg at 08/02/17 0639   • sodium chloride (PF) 0.9 % injection 2 mL  2 mL Injection 2 times per day Pascale Griffith MD   2 mL at 08/02/17 0820   • sodium chloride (PF) 0.9 % injection 2 mL  2 mL Injection PRN Pascale Griffith MD       • enoxaparin (LOVENOX) injection 30 mg  30 mg Subcutaneous Q24H Pascale Griffith MD   30 mg at 08/02/17 0814   • nitroPRUSSide (NIPRIDE) 50 mg in sodium chloride 0.9 % 250 mL infusion   Intravenous Continuous Pascale Griffith MD   Stopped at 08/01/17 0558     ALLERGIES:   Allergen Reactions   • Penicillins SWELLING   • Nsaids Other (See Comments)     Due to Chronic Kidney Disease   • Sulfa Antibiotics SWELLING   • Tape [Adhesive] Dermatitis     Active Problems:    * No active hospital problems. *    Blood pressure 165/73, pulse 60, temperature 98.3 °F (36.8 °C), temperature source Temporal Artery, resp. rate 18, height 5' 2\" (1.575 m), weight 67.2 kg, SpO2 98 %.    Subjective:  Symptoms:  Stable.  No shortness of breath or cough.  (Remains in the icu. . Comfortable, no sob. Had nausea earlier in radiology).    Diet:  No vomiting.    Activity level: Returning to normal.    Pain:  She reports no pain.      Objective:  General Appearance:  Comfortable and in no acute distress.    Vital signs: (most recent): Blood pressure 165/73, pulse 60, temperature 98.3 °F (36.8 °C), temperature source Temporal Artery, resp. rate 18, height 5' 2\" (1.575 m), weight 67.2 kg, SpO2 98 %.  No fever.    Output: Producing urine.    HEENT: Normal HEENT exam.    Lungs:  Normal effort.  She is not in respiratory distress.    Heart: Normal rate.  No friction rub.   Abdomen: Abdomen is soft.  Bowel  sounds are normal.     Extremities: There is no dependent edema.  (Good bruit avf)  Neurological: Patient is alert.    Skin:  Warm and dry.        Intake/Output Summary (Last 24 hours) at 08/02/17 1338  Last data filed at 08/02/17 1030   Gross per 24 hour   Intake             1503 ml   Output             1100 ml   Net              403 ml     Component      Latest Ref Rng & Units 8/2/2017   IRON      50 - 170 mcg/dL 46 (L)   Total Iron Binding Capacity      250 - 450 mcg/dL 255   PERCENT IRON SATURATION      15 - 45 % 18   PHOSPHORUS      2.4 - 4.7 mg/dL 5.4 (H)   Ferritin      8 - 252 ng/mL 57       Recent Labs  Lab 08/02/17 0520 08/01/17  0514 07/31/17  1922   WBC 5.7 7.0 7.9   HGB 7.6* 7.5* 8.6*   HCT 23.6* 24.5* 26.9*    287 331         Recent Labs  Lab 08/02/17 0520 08/01/17 0514 07/31/17  1922   SODIUM 141 141 138   POTASSIUM 3.5 3.5 3.7   CHLORIDE 104 104 99   CO2 26 28 28   BUN 69* 77* 83*   CREATININE 3.56* 4.06* 4.60*   GLUCOSE 100* 97 112*   CALCIUM 9.2 8.9 9.3       Assessment & Plan  1. Htn. meds adjusted, 165/73. norvasc started, increase in a couple days if still high  2. Bradycardia. Improving, 60  3. Anemia. Iron deficient. Will give iv  4. Ckd/arf. Baseline creat around 3. Good uo, falling creatinine with increased hr. Hold on dialysis today  Gary Farias MD  8/2/2017

## 2024-01-30 ENCOUNTER — ANNUAL EXAM (OUTPATIENT)
Dept: OBGYN CLINIC | Facility: CLINIC | Age: 34
End: 2024-01-30
Payer: COMMERCIAL

## 2024-01-30 VITALS
SYSTOLIC BLOOD PRESSURE: 130 MMHG | WEIGHT: 289.6 LBS | HEIGHT: 61 IN | DIASTOLIC BLOOD PRESSURE: 80 MMHG | BODY MASS INDEX: 54.68 KG/M2

## 2024-01-30 DIAGNOSIS — Z30.41 ENCOUNTER FOR SURVEILLANCE OF CONTRACEPTIVE PILLS: ICD-10-CM

## 2024-01-30 DIAGNOSIS — B37.2 CUTANEOUS CANDIDIASIS: ICD-10-CM

## 2024-01-30 DIAGNOSIS — Z01.419 WOMEN'S ANNUAL ROUTINE GYNECOLOGICAL EXAMINATION: Primary | ICD-10-CM

## 2024-01-30 PROBLEM — O09.293 HX OF PREECLAMPSIA, PRIOR PREGNANCY, CURRENTLY PREGNANT, THIRD TRIMESTER: Status: RESOLVED | Noted: 2023-02-24 | Resolved: 2024-01-30

## 2024-01-30 PROBLEM — O99.210 MATERNAL MORBID OBESITY, ANTEPARTUM (HCC): Status: RESOLVED | Noted: 2023-06-15 | Resolved: 2024-01-30

## 2024-01-30 PROBLEM — O10.919 CHRONIC HYPERTENSION AFFECTING PREGNANCY: Status: RESOLVED | Noted: 2023-04-26 | Resolved: 2024-01-30

## 2024-01-30 PROBLEM — Z98.891 STATUS POST REPEAT LOW TRANSVERSE CESAREAN SECTION: Status: RESOLVED | Noted: 2023-04-26 | Resolved: 2024-01-30

## 2024-01-30 PROBLEM — E66.01 MATERNAL MORBID OBESITY, ANTEPARTUM (HCC): Status: RESOLVED | Noted: 2023-06-15 | Resolved: 2024-01-30

## 2024-01-30 PROBLEM — O99.210 OBESITY AFFECTING PREGNANCY: Status: RESOLVED | Noted: 2023-02-24 | Resolved: 2024-01-30

## 2024-01-30 PROBLEM — O36.60X0 FETAL MACROSOMIA DURING PREGNANCY: Status: RESOLVED | Noted: 2023-07-21 | Resolved: 2024-01-30

## 2024-01-30 PROCEDURE — 99395 PREV VISIT EST AGE 18-39: CPT | Performed by: OBSTETRICS & GYNECOLOGY

## 2024-01-30 PROCEDURE — 0503F POSTPARTUM CARE VISIT: CPT | Performed by: OBSTETRICS & GYNECOLOGY

## 2024-01-30 RX ORDER — NYSTATIN 100000 U/G
OINTMENT TOPICAL 2 TIMES DAILY
Qty: 30 G | Refills: 0 | Status: SHIPPED | OUTPATIENT
Start: 2024-01-30 | End: 2024-02-29

## 2024-01-30 RX ORDER — LEVONORGESTREL AND ETHINYL ESTRADIOL 0.15-0.03
1 KIT ORAL DAILY
Qty: 84 TABLET | Refills: 3 | Status: SHIPPED | OUTPATIENT
Start: 2024-01-30 | End: 2024-04-23

## 2024-01-30 NOTE — PROGRESS NOTES
ASSESSMENT & PLAN:   Diagnoses and all orders for this visit:    Women's annual routine gynecological examination    Encounter for surveillance of contraceptive pills  - will increase E dose for break through bleeding  -     levonorgestrel-ethinyl estradiol (Altavera) 0.15-30 MG-MCG per tablet; Take 1 tablet by mouth daily     Cutaneous candidiasis  -     nystatin (MYCOSTATIN) ointment; Apply topically 2 (two) times a day          The following were reviewed in today's visit: ASCCP guidelines, Gardisil vaccination, STD testing breast self exam, use and side effects of OCPs, family planning choices, exercise, and healthy diet.    Patient to return to office in yearly for annual exam.     All questions have been answered to her satisfaction.        CC:  Annual Gynecologic Examination  Chief Complaint   Patient presents with    Gynecologic Exam     Pap 2022 neg/neg HPV       HPI: Kathleen Bruce is a 33 y.o.  who presents for annual gynecologic examination.  She has the following concerns:  spotting about 1 week before period on current pill      Health Maintenance:    Exercise: intermittently  Breast exams/breast awareness: yes    Past Medical History:   Diagnosis Date    Hypertension     pre eclampsia    Seasonal allergies     Varicella     as a child       Past Surgical History:   Procedure Laterality Date     SECTION  2019    [place]  DELIVERY ONLY N/A 2023    Procedure:  SECTION () REPEAT;  Surgeon: Riri Caro MD;  Location: [facility] ;  Service: Obstetrics    WISDOM TOOTH EXTRACTION         Past OB/Gyn History:  Period Cycle (Days): 28  Period Duration (Days): 10  Period Pattern: Regular  Menstrual Flow: Moderate, Heavy, Light  Dysmenorrhea: NonePatient's last menstrual period was 2024.    Last Pap:  : no abnormalities  History of abnormal Pap smear: no  HPV vaccine completed: unknown    Patient is currently sexually active.   STD testing:  no  Current contraception: OCP (estrogen/progesterone)      Family History  Family History   Problem Relation Age of Onset    Diabetes Mother     Hypertension Mother     COPD Mother     Hyperlipidemia Father     No Known Problems Sister     No Known Problems Daughter     Heart attack Maternal Grandmother     Heart attack Maternal Grandfather     Dementia Paternal Grandmother     Dementia Paternal Grandfather        Family history of uterine or ovarian cancer: no  Family history of breast cancer: no  Family history of colon cancer: no    Social History:  Social History     Socioeconomic History    Marital status: Single     Spouse name: Not on file    Number of children: Not on file    Years of education: Not on file    Highest education level: Not on file   Occupational History    Not on file   Tobacco Use    Smoking status: Never    Smokeless tobacco: Never   Vaping Use    Vaping status: Never Used   Substance and Sexual Activity    Alcohol use: Yes     Comment: social    Drug use: Never    Sexual activity: Yes     Partners: Male     Birth control/protection: OCP     Comment: Trying to conceive   Other Topics Concern    Not on file   Social History Narrative    Not on file     Social Determinants of Health     Financial Resource Strain: Low Risk  (2/3/2023)    Overall Financial Resource Strain (CARDIA)     Difficulty of Paying Living Expenses: Not hard at all   Food Insecurity: No Food Insecurity (2/3/2023)    Hunger Vital Sign     Worried About Running Out of Food in the Last Year: Never true     Ran Out of Food in the Last Year: Never true   Transportation Needs: No Transportation Needs (2/3/2023)    PRAPARE - Transportation     Lack of Transportation (Medical): No     Lack of Transportation (Non-Medical): No   Physical Activity: Insufficiently Active (2/3/2023)    Exercise Vital Sign     Days of Exercise per Week: 2 days     Minutes of Exercise per Session: 30 min   Stress: No Stress Concern Present (2/3/2023)  "   [employer] - Occupational Stress Questionnaire     Feeling of Stress : Not at all   Social Connections: Moderately Isolated (2/3/2023)    Social Connection and Isolation Panel [NHANES]     Frequency of Communication with Friends and Family: Three times a week     Frequency of Social Gatherings with Friends and Family: Three times a week     Attends Holiness Services: Never     Active Member of Clubs or Organizations: No     Attends Club or Organization Meetings: Never     Marital Status:    Intimate Partner Violence: Not At Risk (2/3/2023)    Humiliation, Afraid, Rape, and Kick questionnaire     Fear of Current or Ex-Partner: No     Emotionally Abused: No     Physically Abused: No     Sexually Abused: No   Housing Stability: Low Risk  (2/3/2023)    Housing Stability Vital Sign     Unable to Pay for Housing in the Last Year: No     Number of Places Lived in the Last Year: 1     Unstable Housing in the Last Year: No     Domestic violence screen: negative    Allergies:  Allergies   Allergen Reactions    Shellfish-Derived Products - Food Allergy Anaphylaxis     Has epi pen , numbness mouth and tongue    Other Eye Swelling     Seasonal        Medications:    Current Outpatient Medications:     levonorgestrel-ethinyl estradiol (Altavera) 0.15-30 MG-MCG per tablet, Take 1 tablet by mouth daily, Disp: 84 tablet, Rfl: 3    nystatin (MYCOSTATIN) ointment, Apply topically 2 (two) times a day, Disp: 30 g, Rfl: 0    Review of Systems:  Review of Systems   Constitutional: Negative.    HENT: Negative.     Respiratory: Negative.     Cardiovascular: Negative.    Gastrointestinal: Negative.    Genitourinary: Negative.    Skin: Negative.    Neurological: Negative.    Psychiatric/Behavioral: Negative.           Physical Exam:  /80 (BP Location: Right arm, Patient Position: Sitting, Cuff Size: Standard)   Ht 5' 1\" (1.549 m)   Wt 131 kg (289 lb 9.6 oz)   LMP 01/27/2024   BMI 54.72 kg/m²  "   Physical Exam  Constitutional:       Appearance: Normal appearance.   Genitourinary:      Bladder and urethral meatus normal.      No lesions in the vagina.      Right Labia: No rash, tenderness, lesions, skin changes or Bartholin's cyst.     Left Labia: No tenderness, lesions, skin changes, Bartholin's cyst or rash.     No vaginal erythema, tenderness or bleeding.        Right Adnexa: not tender, not full and no mass present.     Left Adnexa: not tender, not full and no mass present.     Cervix is parous.      No cervical motion tenderness, discharge, lesion or polyp.      Uterus is not enlarged, fixed or tender.      No uterine mass detected.     Urethral meatus caruncle not present.     No urethral tenderness or mass present.   Breasts:     Right: No swelling, bleeding, inverted nipple, mass, nipple discharge, skin change or tenderness.      Left: No swelling, bleeding, inverted nipple, mass, nipple discharge, skin change or tenderness.   HENT:      Head: Normocephalic and atraumatic.   Eyes:      Extraocular Movements: Extraocular movements intact.      Conjunctiva/sclera: Conjunctivae normal.      Pupils: Pupils are equal, round, and reactive to light.   Cardiovascular:      Rate and Rhythm: Normal rate and regular rhythm.      Heart sounds: Normal heart sounds. No murmur heard.  Pulmonary:      Effort: Pulmonary effort is normal. No respiratory distress.      Breath sounds: Normal breath sounds. No wheezing or rales.   Abdominal:      General: There is no distension.      Palpations: Abdomen is soft.      Tenderness: There is no abdominal tenderness. There is no guarding.   Neurological:      General: No focal deficit present.      Mental Status: She is alert and oriented to person, place, and time.   Skin:     General: Skin is warm and dry.   Psychiatric:         Mood and Affect: Mood normal.         Behavior: Behavior normal.   Vitals and nursing note reviewed.

## 2024-03-05 ENCOUNTER — OFFICE VISIT (OUTPATIENT)
Dept: BARIATRICS | Facility: CLINIC | Age: 34
End: 2024-03-05
Payer: COMMERCIAL

## 2024-03-05 VITALS
BODY MASS INDEX: 51.17 KG/M2 | WEIGHT: 288.8 LBS | DIASTOLIC BLOOD PRESSURE: 100 MMHG | HEIGHT: 63 IN | SYSTOLIC BLOOD PRESSURE: 148 MMHG

## 2024-03-05 DIAGNOSIS — R03.0 ELEVATED BP WITHOUT DIAGNOSIS OF HYPERTENSION: ICD-10-CM

## 2024-03-05 DIAGNOSIS — Z13.220 SCREENING CHOLESTEROL LEVEL: ICD-10-CM

## 2024-03-05 DIAGNOSIS — R53.83 OTHER FATIGUE: ICD-10-CM

## 2024-03-05 DIAGNOSIS — E66.01 CLASS 3 SEVERE OBESITY DUE TO EXCESS CALORIES WITHOUT SERIOUS COMORBIDITY WITH BODY MASS INDEX (BMI) OF 50.0 TO 59.9 IN ADULT (HCC): Primary | ICD-10-CM

## 2024-03-05 DIAGNOSIS — Z83.3 FAMILY HISTORY OF DIABETES MELLITUS: ICD-10-CM

## 2024-03-05 PROBLEM — E66.813 CLASS 3 SEVERE OBESITY DUE TO EXCESS CALORIES WITHOUT SERIOUS COMORBIDITY WITH BODY MASS INDEX (BMI) OF 50.0 TO 59.9 IN ADULT (HCC): Status: ACTIVE | Noted: 2024-03-05

## 2024-03-05 PROCEDURE — 99204 OFFICE O/P NEW MOD 45 MIN: CPT | Performed by: NURSE PRACTITIONER

## 2024-03-05 RX ORDER — PHENOL 1.4 %
1 AEROSOL, SPRAY (ML) MUCOUS MEMBRANE DAILY
COMMUNITY

## 2024-03-05 NOTE — PROGRESS NOTES
Assessment/Plan:    Class 3 severe obesity due to excess calories without serious comorbidity with body mass index (BMI) of 50.0 to 59.9 in adult (HCC)  - Discussed options of [facility]-Intensive Lifestyle Intervention Program, Very Low Calorie Diet-VLCD, Conservative Program, Ben-En-Y Gastric Bypass, and Vertical Sleeve Gastrectomy and the role of weight loss medications.  - Explained the importance of making lifestyle changes in addition to starting any anti-obesity medications.   - Patient is interested in pursuing Conservative Program and follow up visits with medical weight management provider.  - Initial weight loss goal of 5-10% weight loss for improved health  - Weight loss can improve patient's co-morbid conditions and/or prevent weight-related complications.  - Weight is not at goal and patient has been unable to achieve a meaningful weight loss above 5% using various programs and tools for more than 6 months  - Labs ordered today -CBC, CMP, lipids, thyroid, A1c, fasting insulin, vitamin D  - patient lifestyle habits were reviewed and barriers to weight loss were addressed today.  Nutrition was discussed at length and patient was given a sample meal plan with a calorie range of 1600-1800cal/day to use as a guide to better structure her meals and distribute her calories evenly throughout the day.  Patient was encouraged to avoid skipping meals and to have small snacks between her breakfast and lunch and her lunch and dinner.  Protein shakes were discussed and patient was given a protein shake guide to choose balanced protein shakes to use as her lunch on days that she is unable to prepare a meal.  Activity level was discussed and patient was encouraged to start with a short exercise routine 2 to 3 days a week and advance from there.    Medications were discussed:  Phentermine to be avoided currently due to patient's elevated blood pressure  Topiramate was discussed and may consider to help with hunger as  patient denies any history of kidney stones  Bupropion/naltrexone were discussed and may help with cravings  GLP-1 medications were discussed and patient will inquire with insurance company if Zepbound or Wegovy are covered medications under her insurance plan  Will further evaluate medication options after lab work has been reviewed    Goals:  Calorie Goal: 0859-3307 calories per day  Do not skip meals.  Food log via matthew or provided paper log (matthew options include www.myfitnesspal.com, sparkpeople.com, loseit.com, calorieking.com, Sharely.Us)  No sugary beverages.   At least 64oz of water daily.  Increase physical activity by 10 minutes daily. Gradually increase physical activity to a goal of 5 days per week for 30 minutes of MODERATE intensity PLUS 2 days per week of FULL BODY resistance training     Elevated BP without diagnosis of hypertension  Patient had previously been treated for hypertension while she was pregnant but has been off medications  Patient will monitor blood pressure at home and call her primary if her blood pressure continues to stay elevated         Kathleen was seen today for consult.    Diagnoses and all orders for this visit:    Class 3 severe obesity due to excess calories without serious comorbidity with body mass index (BMI) of 50.0 to 59.9 in adult (HCC)  -     Vitamin D 25 hydroxy; Future  -     Hemoglobin A1C; Future  -     TSH, 3rd generation with Free T4 reflex; Future  -     Lipid panel; Future  -     Comprehensive metabolic panel; Future  -     CBC and differential; Future  -     Insulin, fasting; Future    Family history of diabetes mellitus  -     Vitamin D 25 hydroxy; Future  -     Hemoglobin A1C; Future  -     TSH, 3rd generation with Free T4 reflex; Future  -     Lipid panel; Future  -     Comprehensive metabolic panel; Future  -     CBC and differential; Future  -     Insulin, fasting; Future    Elevated BP without diagnosis of hypertension  -     Vitamin D 25 hydroxy;  Future  -     Hemoglobin A1C; Future  -     TSH, 3rd generation with Free T4 reflex; Future  -     Lipid panel; Future  -     Comprehensive metabolic panel; Future  -     CBC and differential; Future  -     Insulin, fasting; Future    Other fatigue  -     Vitamin D 25 hydroxy; Future  -     Hemoglobin A1C; Future  -     TSH, 3rd generation with Free T4 reflex; Future  -     Lipid panel; Future  -     Comprehensive metabolic panel; Future  -     CBC and differential; Future  -     Insulin, fasting; Future    Screening cholesterol level  -     Vitamin D 25 hydroxy; Future  -     Hemoglobin A1C; Future  -     TSH, 3rd generation with Free T4 reflex; Future  -     Lipid panel; Future  -     Comprehensive metabolic panel; Future  -     CBC and differential; Future  -     Insulin, fasting; Future       Patient denies personal history of pancreatitis. Patient also denies personal and family history of medullary thyroid cancer and multiple endocrine neoplasia type 2 (MEN 2 tumor). Patient denies any history of kidney stones, seizures, or glaucoma.      Total time spent reviewing chart, interviewing patient, examining patient, discussing plan, answering all questions, and documentin min, with >50% face-to-face time spent counseling patient on nonsurgical interventions for the treatment of excess weight. Discussed in detail nonsurgical options including intensive lifestyle intervention program, very low-calorie diet program and conservative program.  Discussed the role of weight loss medications.  Counseled patient on diet behavior and exercise modification for weight loss.    Follow up in approximately 2 months with Non-Surgical Physician/Advanced Practitioner.    Subjective:   Chief Complaint   Patient presents with    Consult     Pt is here for MWM consult       Patient ID: Kathleen Bruce  is a 33 y.o. female with excess weight/obesity here to pursue weight management.  Previous notes and records have been  reviewed.    Past Medical History:   Diagnosis Date    Hypertension     pre eclampsia    Seasonal allergies     Varicella     as a child     Past Surgical History:   Procedure Laterality Date     SECTION  2019    [place]  DELIVERY [place] N/A 2023    Procedure:  SECTION () REPEAT;  Surgeon: Riri Caro MD;  Location: [facility] ;  Service: Obstetrics    WISDOM TOOTH EXTRACTION         HPI:  Wt Readings from Last 20 Encounters:   24 131 kg (288 lb 12.8 oz)   24 131 kg (289 lb 9.6 oz)   23 130 kg (286 lb)   23 131 kg (289 lb)   23 (!) 148 kg (325 lb 10.1 oz)   08/10/23 (!) 142 kg (312 lb 6.4 oz)   23 (!) 142 kg (312 lb)   23 (!) 140 kg (309 lb 9.6 oz)   23 (!) 139 kg (305 lb 12.8 oz)   23 (!) 139 kg (306 lb 3.2 oz)   23 (!) 137 kg (301 lb)   06/15/23 136 kg (298 lb 12.8 oz)   06/15/23 135 kg (298 lb)   23 134 kg (296 lb 3.2 oz)   23 131 kg (289 lb)   23 132 kg (290 lb)   23 132 kg (292 lb 1.8 oz)   23 131 kg (289 lb 6.4 oz)   23 131 kg (288 lb 12.8 oz)   23 130 kg (287 lb)       Patient presents today to medical weight management office for consult.  Patient has struggled with her weight since college.  She was able to lose weight after college was completed by monitoring her diet and becoming more active.  She was able to get down to 160 pounds which she was comfortable at.  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  Patient was about 230 pounds when she got pregnant with her first child and gained about 60 pounds in her first pregnancy.  Patient only gained about 20 to 30 pounds with her second pregnancy and has been successful at losing most of it.  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  Patient often skips meals and does not have a good  "routine as her work schedule changes.  Patient was using meal replacement shakes and tried herbal life to avoid skipping meals.  Patient does not have a current exercise plan due to her busy lifestyle and her 2 young children.  Patient does not want to discuss bariatric surgery as a treatment for her obesity at this time.      Obesity/Excess Weight:  Severity: Very Severe  Onset:  college years    Modifiers: Diet and Exercise  Contributing factors: Poor Food Choices, Insufficient Physical Activity, Stress/Emotional Eating, Pregnancy, Lack of knowledge of appropriate lifestyle changes, and Insufficient time to make appropriate lifestyle changes  Associated symptoms: comorbid conditions, fatigue, increased joint pain, decreased exercise capacity, body image issues, decreased self esteem, increased shortness of breath, decreased mobility, depression, inability to do certain activities, and clothes do not fit    Diet recall:  Schedule varies - sometimes goes all day without eating  B: yogurt OR cottage cheese with fruit  S: veggies and ranch  OR string cheese OR crackers OR pretzels  L: skips most of the time OR cold cut roll ups OR salad with chicken strips  D: protein, vegetable, starch  S: ice cream occasionally OR kristen crackers    Hydration: 1-2 cups coffee, water, iced tea or soda occasionally  Alcohol: rare  Smoking: no  Exercise: occasionally walking  Occupation: desk job - remote occasionally  Sleep: varies - has a 6 month old  STOP bang: 3/8    Current weight: 288.8 lbs  Current BMI: 51.98  Current Waist Measurement: 49.5 in  Goal weight: \"get healthy\" - around 200      The following portions of the patient's history were reviewed and updated as appropriate: allergies, current medications, past family history, past medical history, past social history, past surgical history, and problem list.    Family History   Problem Relation Age of Onset    Diabetes Mother     Hypertension Mother     COPD Mother     " "Hyperlipidemia Father     No Known Problems Sister     No Known Problems Daughter     Heart attack Maternal Grandmother     Heart attack Maternal Grandfather     Dementia Paternal Grandmother     Dementia Paternal Grandfather         Review of Systems   Constitutional:  Negative for fatigue.   HENT:  Negative for sore throat.    Respiratory:  Positive for shortness of breath (with exertion). Negative for cough.    Cardiovascular:  Negative for chest pain, palpitations and leg swelling.   Gastrointestinal:  Negative for abdominal pain, constipation, diarrhea and nausea.   Genitourinary:  Negative for dysuria.   Musculoskeletal:  Positive for arthralgias and back pain.   Skin:  Negative for rash.   Neurological:  Negative for headaches.   Psychiatric/Behavioral:  Negative for dysphoric mood. The patient is not nervous/anxious.        Objective:  /100 (BP Location: Left arm, Patient Position: Sitting, Cuff Size: Large)   Ht 5' 2.5\" (1.588 m)   Wt 131 kg (288 lb 12.8 oz)   LMP 02/28/2024 (Exact Date)   BMI 51.98 kg/m²     Physical Exam  Vitals and nursing note reviewed.   Constitutional:       Appearance: Normal appearance. She is obese.   HENT:      Head: Normocephalic.   Pulmonary:      Effort: Pulmonary effort is normal.   Neurological:      General: No focal deficit present.      Mental Status: She is alert and oriented to person, place, and time.   Psychiatric:         Mood and Affect: Mood normal.         Behavior: Behavior normal.         Thought Content: Thought content normal.         Judgment: Judgment normal.              Labs and Imaging  Recent labs and imaging have been personally reviewed.  Lab Results   Component Value Date    WBC 11.51 (H) 08/18/2023    HGB 9.6 (L) 08/18/2023    HCT 29.2 (L) 08/18/2023    MCV 92 08/18/2023     08/18/2023     Lab Results   Component Value Date    SODIUM 136 08/17/2023    K 4.1 08/17/2023     08/17/2023    CO2 20 (L) 08/17/2023    AGAP 10 08/17/2023 " "   BUN 7 08/17/2023    CREATININE 0.57 (L) 08/17/2023    GLUC 83 08/17/2023    GLUF 87 04/20/2023    CALCIUM 8.8 08/17/2023    AST 15 08/17/2023    ALT 11 08/17/2023    ALKPHOS 104 08/17/2023    TP 6.9 08/17/2023    TBILI 0.30 08/17/2023    EGFR 122 08/17/2023     Lab Results   Component Value Date    HGBA1C 5.1 10/17/2018     Lab Results   Component Value Date    TSH 0.88 10/17/2018     No results found for: \"CHOLESTEROL\"  No results found for: \"HDL\"  No results found for: \"TRIG\"  No results found for: \"LDLCALC\"  "

## 2024-03-05 NOTE — ASSESSMENT & PLAN NOTE
Patient had previously been treated for hypertension while she was pregnant but has been off medications  Patient will monitor blood pressure at home and call her primary if her blood pressure continues to stay elevated

## 2024-03-06 ENCOUNTER — APPOINTMENT (OUTPATIENT)
Dept: LAB | Facility: MEDICAL CENTER | Age: 34
End: 2024-03-06
Payer: COMMERCIAL

## 2024-03-06 DIAGNOSIS — R53.83 OTHER FATIGUE: ICD-10-CM

## 2024-03-06 DIAGNOSIS — E66.01 CLASS 3 SEVERE OBESITY DUE TO EXCESS CALORIES WITHOUT SERIOUS COMORBIDITY WITH BODY MASS INDEX (BMI) OF 50.0 TO 59.9 IN ADULT (HCC): ICD-10-CM

## 2024-03-06 DIAGNOSIS — Z83.3 FAMILY HISTORY OF DIABETES MELLITUS: ICD-10-CM

## 2024-03-06 DIAGNOSIS — Z13.220 SCREENING CHOLESTEROL LEVEL: ICD-10-CM

## 2024-03-06 DIAGNOSIS — R03.0 ELEVATED BP WITHOUT DIAGNOSIS OF HYPERTENSION: ICD-10-CM

## 2024-03-06 LAB
25(OH)D3 SERPL-MCNC: 23.3 NG/ML (ref 30–100)
ALBUMIN SERPL BCP-MCNC: 4 G/DL (ref 3.5–5)
ALP SERPL-CCNC: 83 U/L (ref 34–104)
ALT SERPL W P-5'-P-CCNC: 32 U/L (ref 7–52)
ANION GAP SERPL CALCULATED.3IONS-SCNC: 6 MMOL/L
AST SERPL W P-5'-P-CCNC: 25 U/L (ref 13–39)
BASOPHILS # BLD AUTO: 0.02 THOUSANDS/ÂΜL (ref 0–0.1)
BASOPHILS NFR BLD AUTO: 1 % (ref 0–1)
BILIRUB SERPL-MCNC: 0.29 MG/DL (ref 0.2–1)
BUN SERPL-MCNC: 10 MG/DL (ref 5–25)
CALCIUM SERPL-MCNC: 8.9 MG/DL (ref 8.4–10.2)
CHLORIDE SERPL-SCNC: 105 MMOL/L (ref 96–108)
CHOLEST SERPL-MCNC: 173 MG/DL
CO2 SERPL-SCNC: 27 MMOL/L (ref 21–32)
CREAT SERPL-MCNC: 0.77 MG/DL (ref 0.6–1.3)
EOSINOPHIL # BLD AUTO: 0.03 THOUSAND/ÂΜL (ref 0–0.61)
EOSINOPHIL NFR BLD AUTO: 1 % (ref 0–6)
ERYTHROCYTE [DISTWIDTH] IN BLOOD BY AUTOMATED COUNT: 12.5 % (ref 11.6–15.1)
EST. AVERAGE GLUCOSE BLD GHB EST-MCNC: 117 MG/DL
GFR SERPL CREATININE-BSD FRML MDRD: 101 ML/MIN/1.73SQ M
GLUCOSE P FAST SERPL-MCNC: 97 MG/DL (ref 65–99)
HBA1C MFR BLD: 5.7 %
HCT VFR BLD AUTO: 40.2 % (ref 34.8–46.1)
HDLC SERPL-MCNC: 55 MG/DL
HGB BLD-MCNC: 13.1 G/DL (ref 11.5–15.4)
IMM GRANULOCYTES # BLD AUTO: 0.01 THOUSAND/UL (ref 0–0.2)
IMM GRANULOCYTES NFR BLD AUTO: 0 % (ref 0–2)
INSULIN SERPL-ACNC: 17.94 UIU/ML (ref 1.9–23)
LDLC SERPL CALC-MCNC: 86 MG/DL (ref 0–100)
LYMPHOCYTES # BLD AUTO: 2.28 THOUSANDS/ÂΜL (ref 0.6–4.47)
LYMPHOCYTES NFR BLD AUTO: 53 % (ref 14–44)
MCH RBC QN AUTO: 27.7 PG (ref 26.8–34.3)
MCHC RBC AUTO-ENTMCNC: 32.6 G/DL (ref 31.4–37.4)
MCV RBC AUTO: 85 FL (ref 82–98)
MONOCYTES # BLD AUTO: 0.24 THOUSAND/ÂΜL (ref 0.17–1.22)
MONOCYTES NFR BLD AUTO: 6 % (ref 4–12)
NEUTROPHILS # BLD AUTO: 1.66 THOUSANDS/ÂΜL (ref 1.85–7.62)
NEUTS SEG NFR BLD AUTO: 39 % (ref 43–75)
NONHDLC SERPL-MCNC: 118 MG/DL
NRBC BLD AUTO-RTO: 0 /100 WBCS
PLATELET # BLD AUTO: 260 THOUSANDS/UL (ref 149–390)
PMV BLD AUTO: 11.6 FL (ref 8.9–12.7)
POTASSIUM SERPL-SCNC: 4.4 MMOL/L (ref 3.5–5.3)
PROT SERPL-MCNC: 7.6 G/DL (ref 6.4–8.4)
RBC # BLD AUTO: 4.73 MILLION/UL (ref 3.81–5.12)
SODIUM SERPL-SCNC: 138 MMOL/L (ref 135–147)
TRIGL SERPL-MCNC: 160 MG/DL
TSH SERPL DL<=0.05 MIU/L-ACNC: 1.45 UIU/ML (ref 0.45–4.5)
WBC # BLD AUTO: 4.24 THOUSAND/UL (ref 4.31–10.16)

## 2024-03-06 PROCEDURE — 36415 COLL VENOUS BLD VENIPUNCTURE: CPT

## 2024-03-06 PROCEDURE — 83525 ASSAY OF INSULIN: CPT

## 2024-03-06 PROCEDURE — 82306 VITAMIN D 25 HYDROXY: CPT

## 2024-03-06 PROCEDURE — 85025 COMPLETE CBC W/AUTO DIFF WBC: CPT

## 2024-03-06 PROCEDURE — 80061 LIPID PANEL: CPT

## 2024-03-06 PROCEDURE — 84443 ASSAY THYROID STIM HORMONE: CPT

## 2024-03-06 PROCEDURE — 83036 HEMOGLOBIN GLYCOSYLATED A1C: CPT

## 2024-03-06 PROCEDURE — 80053 COMPREHEN METABOLIC PANEL: CPT

## 2024-03-07 DIAGNOSIS — E66.01 CLASS 3 SEVERE OBESITY DUE TO EXCESS CALORIES WITHOUT SERIOUS COMORBIDITY WITH BODY MASS INDEX (BMI) OF 50.0 TO 59.9 IN ADULT (HCC): Primary | ICD-10-CM

## 2024-03-07 DIAGNOSIS — E66.01 CLASS 3 SEVERE OBESITY DUE TO EXCESS CALORIES WITHOUT SERIOUS COMORBIDITY WITH BODY MASS INDEX (BMI) OF 50.0 TO 59.9 IN ADULT (HCC): ICD-10-CM

## 2024-03-08 ENCOUNTER — TELEPHONE (OUTPATIENT)
Dept: BARIATRICS | Facility: CLINIC | Age: 34
End: 2024-03-08

## 2024-03-08 RX ORDER — SEMAGLUTIDE 0.25 MG/.5ML
0.25 INJECTION, SOLUTION SUBCUTANEOUS WEEKLY
Qty: 2 ML | Refills: 0 | Status: SHIPPED | OUTPATIENT
Start: 2024-03-08 | End: 2024-04-07

## 2024-03-12 NOTE — TELEPHONE ENCOUNTER
PA for wegovy    Submitted via    []CMM-KEY    []Surescripts-Case ID #    []Faxed to plan   [x]Other website Prior Auth (EOC) ID:  [id number]  []Phone call Case ID #      Office notes sent, clinical questions answered. Awaiting determination    Turnaround time for your insurance to make a decision on your Prior Authorization can take 7-21 business days.

## 2024-04-29 ENCOUNTER — OFFICE VISIT (OUTPATIENT)
Dept: URGENT CARE | Age: 34
End: 2024-04-29
Payer: COMMERCIAL

## 2024-04-29 VITALS
TEMPERATURE: 97.6 F | HEART RATE: 107 BPM | DIASTOLIC BLOOD PRESSURE: 90 MMHG | SYSTOLIC BLOOD PRESSURE: 131 MMHG | RESPIRATION RATE: 22 BRPM | OXYGEN SATURATION: 99 %

## 2024-04-29 DIAGNOSIS — J02.9 SORE THROAT: ICD-10-CM

## 2024-04-29 DIAGNOSIS — J02.0 STREP PHARYNGITIS: Primary | ICD-10-CM

## 2024-04-29 LAB — S PYO AG THROAT QL: POSITIVE

## 2024-04-29 PROCEDURE — 99213 OFFICE O/P EST LOW 20 MIN: CPT

## 2024-04-29 PROCEDURE — 87880 STREP A ASSAY W/OPTIC: CPT

## 2024-04-29 PROCEDURE — S9088 SERVICES PROVIDED IN URGENT: HCPCS

## 2024-04-29 RX ORDER — AMOXICILLIN 500 MG/1
500 CAPSULE ORAL EVERY 12 HOURS SCHEDULED
Qty: 20 CAPSULE | Refills: 0 | Status: SHIPPED | OUTPATIENT
Start: 2024-04-29 | End: 2024-05-09

## 2024-04-29 NOTE — PROGRESS NOTES
[facility]        NAME: Kathleen Bruce is a 33 y.o. female  : 1990    MRN: [id number]  DATE: 2024  TIME: 11:43 AM    Assessment and Plan   Strep pharyngitis [J02.0]  1. Strep pharyngitis  amoxicillin (AMOXIL) 500 mg capsule      2. Sore throat  POCT rapid ANTIGEN strepA            Patient Instructions     Please complete full 10 days of antibiotic therapy.  After 3 days of antibiotic therapy, please throw away your current toothbrush and begin using a new one.    Follow up with PCP in 3-5 days.  Proceed to  ER if symptoms worsen.    If tests are performed, our office will contact you with results only if changes need to made to the care plan discussed with you at the visit. [facility]    Chief Complaint     Chief Complaint   Patient presents with    Sore Throat     Patient states that on Saturday she developed sore throat, head congestion, and PND. She notes that she had strep throat recently and symptoms feel similar. She denies chest congestion and SOB. Recently completed amoxicillin for a root canal.          History of Present Illness       33-year-old female presenting for evaluation of sore throat.  Patient states over the past 2 days she has been experiencing sore throat associated with headaches.  She has been taking DayQuil and Advil with minimal relief of her symptoms.  She denies any known sick exposures.  She denies any fevers, chills, chest pain, shortness of breath, nausea vomiting or diarrhea.    Sore Throat   Associated symptoms include headaches. Pertinent negatives include no congestion, coughing, diarrhea, shortness of breath or vomiting.       Review of Systems   Review of Systems   Constitutional:  Negative for chills and fever.   HENT:  Positive for sore throat. Negative for congestion.    Respiratory:  Negative for cough and shortness of breath.    Cardiovascular:  Negative for chest pain.   Gastrointestinal:  Negative  for diarrhea, nausea and vomiting.   Neurological:  Positive for headaches.   All other systems reviewed and are negative.        Current Medications       Current Outpatient Medications:     amoxicillin (AMOXIL) 500 mg capsule, Take 1 capsule (500 mg total) by mouth every 12 (twelve) hours for 10 days, Disp: 20 capsule, Rfl: 0    levonorgestrel-ethinyl estradiol (Altavera) 0.15-30 MG-MCG per tablet, Take 1 tablet by mouth daily, Disp: 84 tablet, Rfl: 3    Multiple Vitamins-Minerals (Multivitamin Women) TABS, Take 1 tablet by mouth in the morning, Disp: , Rfl:     nystatin (MYCOSTATIN) ointment, Apply topically 2 (two) times a day, Disp: 30 g, Rfl: 0    Current Allergies     Allergies as of 2024 - Reviewed 2024   Allergen Reaction Noted    Shellfish-derived products - food allergy Anaphylaxis 2018    Other Eye Swelling 2018            The following portions of the patient's history were reviewed and updated as appropriate: allergies, current medications, past family history, past medical history, past social history, past surgical history and problem list.     Past Medical History:   Diagnosis Date    Hypertension     pre eclampsia    Seasonal allergies     Varicella     as a child       Past Surgical History:   Procedure Laterality Date     SECTION  2019    [place]  DELIVERY ONLY N/A 2023    Procedure:  SECTION () REPEAT;  Surgeon: Riri Caro MD;  Location: [facility] ;  Service: Obstetrics    WISDOM TOOTH EXTRACTION         Family History   Problem Relation Age of Onset    Diabetes Mother     Hypertension Mother     COPD Mother     Hyperlipidemia Father     No Known Problems Sister     No Known Problems Daughter     Heart attack Maternal Grandmother     Heart attack Maternal Grandfather     Dementia Paternal Grandmother     Dementia Paternal Grandfather          Medications have been verified.        Objective   /90   Pulse (!) 107    Temp 97.6 °F (36.4 °C)   Resp 22   SpO2 99%        Physical Exam     Physical Exam  Vitals and nursing note reviewed.   Constitutional:       General: She is not in acute distress.     Appearance: Normal appearance. She is not ill-appearing, toxic-appearing or diaphoretic.   HENT:      Head: Normocephalic and atraumatic.      Right Ear: Tympanic membrane normal.      Left Ear: Tympanic membrane normal.      Nose: Nose normal. No congestion or rhinorrhea.      Mouth/Throat:      Mouth: Mucous membranes are moist.      Pharynx: Oropharynx is clear. Posterior oropharyngeal erythema present. No oropharyngeal exudate.      Tonsils: 3+ on the right. 3+ on the left.   Eyes:      General:         Right eye: No discharge.         Left eye: No discharge.   Cardiovascular:      Rate and Rhythm: Normal rate and regular rhythm.      Pulses: Normal pulses.      Heart sounds: Normal heart sounds. No murmur heard.     No friction rub. No gallop.   Pulmonary:      Effort: Pulmonary effort is normal. No respiratory distress.      Breath sounds: Normal breath sounds. No stridor. No wheezing, rhonchi or rales.   Chest:      Chest wall: No tenderness.   Abdominal:      General: Bowel sounds are normal.      Palpations: Abdomen is soft.      Tenderness: There is no abdominal tenderness.   Lymphadenopathy:      Cervical: Cervical adenopathy present.   Skin:     General: Skin is warm and dry.   Neurological:      Mental Status: She is alert.   Psychiatric:         Mood and Affect: Mood normal.         Behavior: Behavior normal.

## 2024-04-29 NOTE — PATIENT INSTRUCTIONS
Please complete full 10 days of antibiotic therapy.  After 3 days of antibiotic therapy, please throw away your current toothbrush and begin using a new one.    Follow up with PCP in 3-5 days.  Proceed to  ER if symptoms worsen.    If tests are performed, our office will contact you with results only if changes need to made to the care plan discussed with you at the visit. You can review your full results on [facility].    1.  Drink plenty fluids.    2.  Take probiotics [i.e. Yogurt, Acidophilus, Florastor (liquid)] daily.    3.  Over-the-counter medications as needed for symptomatic care.    4.   Advance activities as tolerated.    5.   Follow-up with your primary care physician in 3-4 days.    6.  Go to emergency room if symptoms are worsening.    7.  Use a humidifier at bedtime

## 2025-02-06 DIAGNOSIS — Z30.41 ENCOUNTER FOR SURVEILLANCE OF CONTRACEPTIVE PILLS: ICD-10-CM

## 2025-02-06 RX ORDER — LEVONORGESTREL AND ETHINYL ESTRADIOL 0.15-0.03
1 KIT ORAL DAILY
Qty: 84 TABLET | Refills: 3 | Status: SHIPPED | OUTPATIENT
Start: 2025-02-06

## 2025-04-04 ENCOUNTER — ANNUAL EXAM (OUTPATIENT)
Dept: OBGYN CLINIC | Facility: CLINIC | Age: 35
End: 2025-04-04
Payer: COMMERCIAL

## 2025-04-04 VITALS
HEIGHT: 63 IN | DIASTOLIC BLOOD PRESSURE: 80 MMHG | BODY MASS INDEX: 41.64 KG/M2 | SYSTOLIC BLOOD PRESSURE: 122 MMHG | WEIGHT: 235 LBS

## 2025-04-04 DIAGNOSIS — Z01.419 WELL WOMAN EXAM WITH ROUTINE GYNECOLOGICAL EXAM: Primary | ICD-10-CM

## 2025-04-04 DIAGNOSIS — Z12.4 ENCOUNTER FOR SCREENING FOR MALIGNANT NEOPLASM OF CERVIX: ICD-10-CM

## 2025-04-04 DIAGNOSIS — Z30.41 ENCOUNTER FOR SURVEILLANCE OF CONTRACEPTIVE PILLS: ICD-10-CM

## 2025-04-04 DIAGNOSIS — Z11.51 SCREENING FOR HPV (HUMAN PAPILLOMAVIRUS): ICD-10-CM

## 2025-04-04 PROCEDURE — G0145 SCR C/V CYTO,THINLAYER,RESCR: HCPCS

## 2025-04-04 PROCEDURE — 99395 PREV VISIT EST AGE 18-39: CPT

## 2025-04-04 PROCEDURE — G0476 HPV COMBO ASSAY CA SCREEN: HCPCS

## 2025-04-04 RX ORDER — LEVONORGESTREL AND ETHINYL ESTRADIOL 0.15-0.03
1 KIT ORAL DAILY
Qty: 84 TABLET | Refills: 4 | Status: SHIPPED | OUTPATIENT
Start: 2025-04-04

## 2025-04-04 NOTE — PROGRESS NOTES
Section Full Procedure Note    Indications: patient request     Pre-operative Diagnosis: 1) Pregnancy at 39w1d             2) Type II DM                                             3) Systemic lupus erythematosus                                             4) Anemia                                              5) Positive lou     Post-operative Diagnosis: same    Surgeon: Tor López MD     Assistants: Jo Edwards MD  Who was instrumental in assisting delivery of  along with traction and counter traction to assist with opening and closure of the layers.     Anesthesia: Spinal anesthesia    ASA Class: per anesthesia          Procedure Details   The patient was seen in the Holding Room. The risks, benefits, complications, treatment options, and expected outcomes were discussed with the patient.  The patient concurred with the proposed plan, giving informed consent.  The site of surgery properly noted/marked. The patient was taken to Operating Room # 3, identified as Elza Arcos and the procedure verified as  Delivery. A Time Out was held and the above information confirmed.    After induction of anesthesia, the patient was draped and prepped in the usual sterile manner. A Pfannenstiel incision was made and carried down through the subcutaneous tissue to the fascia. Fascial incision was made and extended transversely. The fascia was  from the underlying rectus tissue superiorly and inferiorly. The peritoneum was identified and entered. Peritoneal incision was extended longitudinally.  A low transverse uterine incision was made. Delivered from cephalic presentation was a 5#6oz, Female with Apgar scores of 8 at one minute and 9 at five minutes. Nuchal cord was noted times 2 and reduced prior to delivery.  After the umbilical cord was clamped and cut cord blood was obtained for evaluation. Cord blood gases were done and are pending at this time. The placenta was removed  ASSESSMENT & PLAN:   Diagnoses and all orders for this visit:    Well woman exam with routine gynecological exam  -     Liquid-based pap, screening    Encounter for screening for malignant neoplasm of cervix  -     Liquid-based pap, screening    Screening for HPV (human papillomavirus)  -     Liquid-based pap, screening    Encounter for surveillance of contraceptive pills  -     levonorgestrel-ethinyl estradiol (Altavera) 0.15-30 MG-MCG per tablet; Take 1 tablet by mouth daily      The following were reviewed in today's visit: ASCCP guidelines, Gardasil vaccination, STD testing breast self exam, STD testing, exercise, and healthy diet.    Patient to return to office in yearly for annual exam.     All questions have been answered to her satisfaction.    CC:  Annual Gynecologic Examination  Chief Complaint   Patient presents with    Gynecologic Exam     The patient is here for a yearly exam. Pap 2022 neg/neg HPV  Regular periods.   No vaginal, bowel, bladder or breast problems.   The patient is doing well on birth control and she would like a refill.      HPI: Kathleen Bruce is a 34 y.o.  who presents for annual gynecologic examination.  She has the following concerns:  none. Doing well on OCP. Regular monthly periods.     Health Maintenance:    Exercise: frequently  Breast exams/breast awareness: yes    Past Medical History:   Diagnosis Date    Hypertension     pre eclampsia    Seasonal allergies     Varicella     as a child     Past Surgical History:   Procedure Laterality Date     SECTION  2019    [place]  DELIVERY ONLY N/A 2023    Procedure:  SECTION () REPEAT;  Surgeon: Riri Caro MD;  Location: [facility] ;  Service: Obstetrics    WISDOM TOOTH EXTRACTION       Past OB/Gyn History:   Patient's last menstrual period was 2025 (exact date).    Last Pap: 2022: no abnormalities  History of abnormal Pap smear: no  HPV vaccine completed: no    Patient  intact and appeared normal. The uterine outline, tubes and ovaries appeared normal. The uterine incision was closed with running locked sutures of 0 Monocryl. A second layer of 0 Monocryl was used in an imbricating fashion.  Hemostasis was observed. Lavage was carried out until clear. The peritoneum was closed with 3-0 Monocryl in a running fashion. The fascia was then reapproximated with running sutures of 0 Vicryl. The skin was reapproximated with 4-0 Vicryl in a subcuticular stitch.     Instrument, sponge, and needle counts were correct prior the abdominal closure and at the conclusion of the case.     Findings:  Normal pelvic anatomy     Estimated Blood Loss: QBL per nursing notes.             Drains: powers                   Specimens: placenta to pathology                  Complications:  None; patient tolerated the procedure well.           Disposition: PACU - hemodynamically stable.           Condition: stable    Tor López MD  6/1/2020  12:05             is currently sexually active.   STD testing: no  Current contraception:cOCP (estrogen/progesterone)    Family History  Family History   Problem Relation Age of Onset    Diabetes Mother     Hypertension Mother     COPD Mother     Hyperlipidemia Father     No Known Problems Sister     No Known Problems Daughter     Heart attack Maternal Grandmother     Heart attack Maternal Grandfather     Dementia Paternal Grandmother     Dementia Paternal Grandfather      Family history of uterine or ovarian cancer: no  Family history of breast cancer: no  Family history of colon cancer: no    Social History:  Social History     Socioeconomic History    Marital status: Single     Spouse name: Not on file    Number of children: Not on file    Years of education: Not on file    Highest education level: Not on file   Occupational History    Not on file   Tobacco Use    Smoking status: Never    Smokeless tobacco: Never   Vaping Use    Vaping status: Never Used   Substance and Sexual Activity    Alcohol use: Not Currently     Comment: social    Drug use: Never    Sexual activity: Yes     Partners: Male     Birth control/protection: None   Other Topics Concern    Not on file   Social History Narrative    Not on file     Social Drivers of Health     Financial Resource Strain: Low Risk  (2/3/2023)    Overall Financial Resource Strain (CARDIA)     Difficulty of Paying Living Expenses: Not hard at all   Food Insecurity: No Food Insecurity (2/3/2023)    Hunger Vital Sign     Worried About Running Out of Food in the Last Year: Never true     Ran Out of Food in the Last Year: Never true   Transportation Needs: No Transportation Needs (2/3/2023)    PRAPARE - Transportation     Lack of Transportation (Medical): No     Lack of Transportation (Non-Medical): No   Physical Activity: Insufficiently Active (2/3/2023)    Exercise Vital Sign     Days of Exercise per Week: 2 days     Minutes of Exercise per Session: 30 min   Stress: No Stress Concern  Present (2/3/2023)    [place] [employer] - Occupational Stress Questionnaire     Feeling of Stress : Not at all   Social Connections: Moderately Isolated (2/3/2023)    Social Connection and Isolation Panel [NHANES]     Frequency of Communication with Friends and Family: Three times a week     Frequency of Social Gatherings with Friends and Family: Three times a week     Attends Religion Services: Never     Active Member of Clubs or Organizations: No     Attends Club or Organization Meetings: Never     Marital Status:    Intimate Partner Violence: Not At Risk (2/3/2023)    Humiliation, Afraid, Rape, and Kick questionnaire     Fear of Current or Ex-Partner: No     Emotionally Abused: No     Physically Abused: No     Sexually Abused: No   Housing Stability: Low Risk  (2/3/2023)    Housing Stability Vital Sign     Unable to Pay for Housing in the Last Year: No     Number of Places Lived in the Last Year: 1     Unstable Housing in the Last Year: No     Domestic violence screen: negative    Allergies:  Allergies   Allergen Reactions    Shellfish-Derived Products - Food Allergy Anaphylaxis     Has epi pen , numbness mouth and tongue    Other Eye Swelling     Seasonal        Medications:    Current Outpatient Medications:     levonorgestrel-ethinyl estradiol (Altavera) 0.15-30 MG-MCG per tablet, Take 1 tablet by mouth daily, Disp: 84 tablet, Rfl: 4    Multiple Vitamins-Minerals (Multivitamin Women) TABS, Take 1 tablet by mouth in the morning, Disp: , Rfl:     nystatin (MYCOSTATIN) ointment, Apply topically 2 (two) times a day, Disp: 30 g, Rfl: 0    Review of Systems:  Review of Systems   Constitutional:  Negative for chills and fever.   Respiratory:  Negative for shortness of breath.    Cardiovascular:  Negative for chest pain.   Gastrointestinal:  Negative for abdominal pain, constipation and diarrhea.   Genitourinary:  Negative for dysuria, frequency, pelvic pain, vaginal discharge and  "vaginal pain.   Psychiatric/Behavioral:  Negative for self-injury and suicidal ideas.          Physical Exam:  /80   Ht 5' 2.5\" (1.588 m)   Wt 107 kg (235 lb)   LMP 03/24/2025 (Exact Date)   BMI 42.30 kg/m²    Physical Exam  Constitutional:       Appearance: Normal appearance.   Genitourinary:      Vulva and urethral meatus normal.      No labial fusion noted.      No vaginal erythema or tenderness.        Right Adnexa: not tender.     Left Adnexa: not tender.     No cervical discharge or friability.      Uterus is not tender.   Breasts:     Breasts are symmetrical.      Right: Normal.      Left: Normal.   HENT:      Head: Normocephalic and atraumatic.   Neck:      Thyroid: No thyroid mass or thyroid tenderness.   Cardiovascular:      Rate and Rhythm: Normal rate and regular rhythm.      Heart sounds: Normal heart sounds. No murmur heard.  Pulmonary:      Effort: Pulmonary effort is normal.      Breath sounds: Normal breath sounds. No wheezing.   Abdominal:      Palpations: Abdomen is soft. There is no mass.      Tenderness: There is no abdominal tenderness.   Lymphadenopathy:      Cervical: No cervical adenopathy.   Neurological:      General: No focal deficit present.      Mental Status: She is alert and oriented to person, place, and time.   Skin:     General: Skin is warm and dry.   Psychiatric:         Mood and Affect: Mood normal.         Behavior: Behavior normal.   Vitals and nursing note reviewed.                              "

## 2025-04-09 ENCOUNTER — RESULTS FOLLOW-UP (OUTPATIENT)
Dept: OBGYN CLINIC | Facility: CLINIC | Age: 35
End: 2025-04-09

## 2025-04-09 LAB
LAB AP GYN PRIMARY INTERPRETATION: NORMAL
Lab: NORMAL
PATH INTERP SPEC-IMP: NORMAL

## 2025-08-05 ENCOUNTER — OFFICE VISIT (OUTPATIENT)
Dept: FAMILY MEDICINE CLINIC | Facility: CLINIC | Age: 35
End: 2025-08-05
Payer: COMMERCIAL

## 2025-08-05 VITALS
RESPIRATION RATE: 16 BRPM | HEIGHT: 62 IN | BODY MASS INDEX: 39.38 KG/M2 | HEART RATE: 78 BPM | DIASTOLIC BLOOD PRESSURE: 76 MMHG | TEMPERATURE: 97.8 F | OXYGEN SATURATION: 98 % | WEIGHT: 214 LBS | SYSTOLIC BLOOD PRESSURE: 114 MMHG

## 2025-08-05 DIAGNOSIS — Z91.013 SHELLFISH ALLERGY: Primary | ICD-10-CM

## 2025-08-05 DIAGNOSIS — Z00.00 ANNUAL PHYSICAL EXAM: ICD-10-CM

## 2025-08-05 PROCEDURE — 99385 PREV VISIT NEW AGE 18-39: CPT | Performed by: INTERNAL MEDICINE

## 2025-08-05 RX ORDER — EPINEPHRINE 0.3 MG/.3ML
0.3 INJECTION SUBCUTANEOUS ONCE
Qty: 0.6 ML | Refills: 1 | Status: SHIPPED | OUTPATIENT
Start: 2025-08-05 | End: 2025-08-05

## 2025-08-06 PROBLEM — Z00.00 ANNUAL PHYSICAL EXAM: Status: ACTIVE | Noted: 2025-08-06

## (undated) DEVICE — SUT MONOCRYL 0 CTX 36 IN Y398H

## (undated) DEVICE — SUT STRATAFIX SPIRAL 4-0 PGA/PCL 30 X 30 CM SXMD2B409

## (undated) DEVICE — SUT STRATAFIX SPIRAL PDS PLUS 1 CTX 18 IN SXPP1A400

## (undated) DEVICE — PACK C-SECTION PBDS

## (undated) DEVICE — Device

## (undated) DEVICE — ADHESIVE SKIN HIGH VISCOSITY EXOFIN 1ML

## (undated) DEVICE — CHLORAPREP HI-LITE 26ML ORANGE

## (undated) DEVICE — ADHESIVE SKIN CLOSURE SYS EXOFIN FUSION 22CM

## (undated) DEVICE — GLOVE INDICATOR PI UNDERGLOVE SZ 7 BLUE

## (undated) DEVICE — SKIN MARKER DUAL TIP WITH RULER CAP, FLEXIBLE RULER AND LABELS: Brand: DEVON